# Patient Record
Sex: FEMALE | Race: WHITE | NOT HISPANIC OR LATINO | Employment: PART TIME | URBAN - METROPOLITAN AREA
[De-identification: names, ages, dates, MRNs, and addresses within clinical notes are randomized per-mention and may not be internally consistent; named-entity substitution may affect disease eponyms.]

---

## 2017-01-12 ENCOUNTER — HOSPITAL ENCOUNTER (OUTPATIENT)
Dept: GENERAL RADIOLOGY | Facility: HOSPITAL | Age: 67
Discharge: HOME | End: 2017-01-12
Admitting: ORTHOPAEDIC SURGERY

## 2017-01-12 ENCOUNTER — APPOINTMENT (OUTPATIENT)
Dept: PREADMISSION TESTING | Facility: HOSPITAL | Age: 67
End: 2017-01-12

## 2017-01-12 VITALS — HEIGHT: 66 IN | WEIGHT: 227.07 LBS | BODY MASS INDEX: 36.49 KG/M2

## 2017-01-12 DIAGNOSIS — Z01.89 LABORATORY TEST: Primary | ICD-10-CM

## 2017-01-12 LAB
ABO GROUP BLD: NORMAL
ANION GAP SERPL CALCULATED.3IONS-SCNC: 13 MMOL/L (ref 3–11)
BILIRUB UR QL STRIP: NEGATIVE
BUN BLD-MCNC: 17 MG/DL (ref 9–23)
BUN/CREAT SERPL: 18.9 (ref 7–25)
CALCIUM SPEC-SCNC: 10.3 MG/DL (ref 8.7–10.4)
CHLORIDE SERPL-SCNC: 99 MMOL/L (ref 99–109)
CLARITY UR: CLEAR
CO2 SERPL-SCNC: 28 MMOL/L (ref 20–31)
COLOR UR: YELLOW
CREAT BLD-MCNC: 0.9 MG/DL (ref 0.6–1.3)
CRP SERPL-MCNC: 30.9 MG/DL (ref 0–10)
DEPRECATED RDW RBC AUTO: 42 FL (ref 37–54)
ERYTHROCYTE [DISTWIDTH] IN BLOOD BY AUTOMATED COUNT: 13.8 % (ref 11.3–14.5)
ERYTHROCYTE [SEDIMENTATION RATE] IN BLOOD: 49 MM/HR (ref 0–30)
GFR SERPL CREATININE-BSD FRML MDRD: 63 ML/MIN/1.73
GLUCOSE BLD-MCNC: 92 MG/DL (ref 70–100)
GLUCOSE UR STRIP-MCNC: NEGATIVE MG/DL
HBA1C MFR BLD: 5.9 % (ref 4.8–5.6)
HCT VFR BLD AUTO: 38.7 % (ref 34.5–44)
HGB BLD-MCNC: 12 G/DL (ref 11.5–15.5)
HGB UR QL STRIP.AUTO: NEGATIVE
KETONES UR QL STRIP: NEGATIVE
LEUKOCYTE ESTERASE UR QL STRIP.AUTO: NEGATIVE
MCH RBC QN AUTO: 26.1 PG (ref 27–31)
MCHC RBC AUTO-ENTMCNC: 31 G/DL (ref 32–36)
MCV RBC AUTO: 84.1 FL (ref 80–99)
MRSA DNA SPEC QL NAA+PROBE: NEGATIVE
NITRITE UR QL STRIP: NEGATIVE
PH UR STRIP.AUTO: 5.5 [PH] (ref 5–8)
PLATELET # BLD AUTO: 312 10*3/MM3 (ref 150–450)
PMV BLD AUTO: 9.5 FL (ref 6–12)
POTASSIUM BLD-SCNC: 3.7 MMOL/L (ref 3.5–5.5)
PROT UR QL STRIP: NEGATIVE
RBC # BLD AUTO: 4.6 10*6/MM3 (ref 3.89–5.14)
RH BLD: NEGATIVE
SODIUM BLD-SCNC: 140 MMOL/L (ref 132–146)
SP GR UR STRIP: 1.01 (ref 1–1.03)
UROBILINOGEN UR QL STRIP: NORMAL
WBC NRBC COR # BLD: 5.44 10*3/MM3 (ref 3.5–10.8)

## 2017-01-12 PROCEDURE — 86901 BLOOD TYPING SEROLOGIC RH(D): CPT

## 2017-01-12 PROCEDURE — 71020 HC CHEST PA AND LATERAL: CPT

## 2017-01-12 PROCEDURE — 87641 MR-STAPH DNA AMP PROBE: CPT | Performed by: ORTHOPAEDIC SURGERY

## 2017-01-12 PROCEDURE — 93005 ELECTROCARDIOGRAM TRACING: CPT

## 2017-01-12 PROCEDURE — 83036 HEMOGLOBIN GLYCOSYLATED A1C: CPT | Performed by: ORTHOPAEDIC SURGERY

## 2017-01-12 PROCEDURE — 85652 RBC SED RATE AUTOMATED: CPT | Performed by: ORTHOPAEDIC SURGERY

## 2017-01-12 PROCEDURE — 81003 URINALYSIS AUTO W/O SCOPE: CPT | Performed by: ORTHOPAEDIC SURGERY

## 2017-01-12 PROCEDURE — 80048 BASIC METABOLIC PNL TOTAL CA: CPT | Performed by: ORTHOPAEDIC SURGERY

## 2017-01-12 PROCEDURE — 85027 COMPLETE CBC AUTOMATED: CPT | Performed by: ORTHOPAEDIC SURGERY

## 2017-01-12 PROCEDURE — 86140 C-REACTIVE PROTEIN: CPT | Performed by: ORTHOPAEDIC SURGERY

## 2017-01-12 PROCEDURE — 86900 BLOOD TYPING SEROLOGIC ABO: CPT

## 2017-01-12 PROCEDURE — 36415 COLL VENOUS BLD VENIPUNCTURE: CPT

## 2017-01-12 PROCEDURE — 93010 ELECTROCARDIOGRAM REPORT: CPT | Performed by: INTERNAL MEDICINE

## 2017-01-12 RX ORDER — HYDROCHLOROTHIAZIDE 12.5 MG/1
12.5 TABLET ORAL DAILY
COMMUNITY
End: 2020-05-14

## 2017-01-12 NOTE — DISCHARGE INSTRUCTIONS
The following information and instructions were given:    NPO after MN except sips of water with routine prescribed medication (except blood thinner, diabetes, or weight reducing medication) unless otherwise instructed by your physician.  Do not eat, drink, smoke or chew gum after MN the night before surgery. This also includes no mints.    DO NOT shave, wear makeup or dark nail polish.    Remove all jewelry (advised to go to jeweler if unable to remove).    Leave anything you consider valuable at home.    Leave your suitcase in the car until after your surgery.    Bring the following with you (if applicable)   -picture ID and insurance cards   -Co-pay/deductible required by insurance   -Medications in the original bottles (not a list) including all over-the-counter  medications if not brought to PAT   -Copy of advance directive, living will or power of  documents if not  brought to PAT   -CPAP or BIPAP mask and tubing (do not bring machine)   -Skin prep instructions sheet   -PAT Pass   Education booklet, brochure, handout or binder given to patient.    Pain Control After Surgery handout given to patient.    Respirex use (handout given to patient) and pneumonia prevention.    Signs and Symptoms of infection.    DVT Prevention stressing the importance of ambulation.    Patient to apply Chlorhexadine wipes to surgical area (as instructed) the night before procedure and the AM of procedure.

## 2017-01-12 NOTE — PAT
"Too early for type and screen; please draw the morning of surgery  Dr. Carlson \"okayed\" patient for surgery   Patient attended joint class on 1-12-17  Aries montero measurements:  Length: 20  Width: 19  "

## 2017-01-12 NOTE — PAT
Marlen with [facility] states she is calling Dr. Moreno office to ensure an  is available for patient on the day of surgery

## 2017-02-07 ENCOUNTER — ANESTHESIA EVENT (OUTPATIENT)
Dept: PERIOP | Facility: HOSPITAL | Age: 67
End: 2017-02-07

## 2017-02-08 ENCOUNTER — ANESTHESIA (OUTPATIENT)
Dept: PERIOP | Facility: HOSPITAL | Age: 67
End: 2017-02-08

## 2017-02-08 ENCOUNTER — APPOINTMENT (OUTPATIENT)
Dept: GENERAL RADIOLOGY | Facility: HOSPITAL | Age: 67
End: 2017-02-08

## 2017-02-08 PROBLEM — J45.909 ASTHMA: Status: ACTIVE | Noted: 2017-02-08

## 2017-02-08 PROBLEM — I10 HTN (HYPERTENSION): Status: ACTIVE | Noted: 2017-02-08

## 2017-02-08 PROBLEM — H91.90 DEAF: Status: ACTIVE | Noted: 2017-02-08

## 2017-02-08 PROBLEM — M17.9 OA (OSTEOARTHRITIS) OF KNEE: Status: ACTIVE | Noted: 2017-02-08

## 2017-02-08 PROBLEM — R73.03 PREDIABETES: Status: ACTIVE | Noted: 2017-02-08

## 2017-02-08 PROBLEM — G89.18 ACUTE POST-OPERATIVE PAIN: Status: ACTIVE | Noted: 2017-02-08

## 2017-02-08 PROBLEM — Z96.651 STATUS POST TOTAL RIGHT KNEE REPLACEMENT: Status: ACTIVE | Noted: 2017-02-08

## 2017-02-08 PROCEDURE — 25010000002 DEXAMETHASONE PER 1 MG: Performed by: NURSE ANESTHETIST, CERTIFIED REGISTERED

## 2017-02-08 PROCEDURE — 25010000002 PROPOFOL 10 MG/ML EMULSION: Performed by: NURSE ANESTHETIST, CERTIFIED REGISTERED

## 2017-02-08 PROCEDURE — 25010000002 ONDANSETRON PER 1 MG: Performed by: NURSE ANESTHETIST, CERTIFIED REGISTERED

## 2017-02-08 PROCEDURE — 25010000002 NEOSTIGMINE 10 MG/10ML SOLUTION: Performed by: NURSE ANESTHETIST, CERTIFIED REGISTERED

## 2017-02-08 PROCEDURE — 25010000002 PROPOFOL 1000 MG/ML EMULSION: Performed by: NURSE ANESTHETIST, CERTIFIED REGISTERED

## 2017-02-08 PROCEDURE — 25010000002 MIDAZOLAM PER 1 MG: Performed by: NURSE ANESTHETIST, CERTIFIED REGISTERED

## 2017-02-08 PROCEDURE — 25010000003 MEPIVACAINE PER 10 ML: Performed by: NURSE ANESTHETIST, CERTIFIED REGISTERED

## 2017-02-08 PROCEDURE — 25010000003 CEFAZOLIN IN DEXTROSE 2-4 GM/100ML-% SOLUTION: Performed by: ORTHOPAEDIC SURGERY

## 2017-02-08 PROCEDURE — 73560 X-RAY EXAM OF KNEE 1 OR 2: CPT

## 2017-02-08 PROCEDURE — 25010000002 FENTANYL CITRATE (PF) 100 MCG/2ML SOLUTION: Performed by: NURSE ANESTHETIST, CERTIFIED REGISTERED

## 2017-02-08 RX ORDER — TRANEXAMIC ACID 100 MG/ML
INJECTION, SOLUTION INTRAVENOUS AS NEEDED
Status: DISCONTINUED | OUTPATIENT
Start: 2017-02-08 | End: 2017-02-08 | Stop reason: SURG

## 2017-02-08 RX ORDER — ATRACURIUM BESYLATE 10 MG/ML
INJECTION, SOLUTION INTRAVENOUS AS NEEDED
Status: DISCONTINUED | OUTPATIENT
Start: 2017-02-08 | End: 2017-02-08 | Stop reason: SURG

## 2017-02-08 RX ORDER — BUPIVACAINE HYDROCHLORIDE 2.5 MG/ML
INJECTION, SOLUTION EPIDURAL; INFILTRATION; INTRACAUDAL AS NEEDED
Status: DISCONTINUED | OUTPATIENT
Start: 2017-02-08 | End: 2017-02-08 | Stop reason: SURG

## 2017-02-08 RX ORDER — PROPOFOL 10 MG/ML
VIAL (ML) INTRAVENOUS AS NEEDED
Status: DISCONTINUED | OUTPATIENT
Start: 2017-02-08 | End: 2017-02-08 | Stop reason: SURG

## 2017-02-08 RX ORDER — PROPOFOL 10 MG/ML
VIAL (ML) INTRAVENOUS AS NEEDED
Status: DISCONTINUED | OUTPATIENT
Start: 2017-02-08 | End: 2017-02-08

## 2017-02-08 RX ORDER — ONDANSETRON 2 MG/ML
INJECTION INTRAMUSCULAR; INTRAVENOUS AS NEEDED
Status: DISCONTINUED | OUTPATIENT
Start: 2017-02-08 | End: 2017-02-08 | Stop reason: SURG

## 2017-02-08 RX ORDER — NEOSTIGMINE METHYLSULFATE 1 MG/ML
INJECTION, SOLUTION INTRAVENOUS AS NEEDED
Status: DISCONTINUED | OUTPATIENT
Start: 2017-02-08 | End: 2017-02-08 | Stop reason: SURG

## 2017-02-08 RX ORDER — FENTANYL CITRATE 50 UG/ML
INJECTION, SOLUTION INTRAMUSCULAR; INTRAVENOUS AS NEEDED
Status: DISCONTINUED | OUTPATIENT
Start: 2017-02-08 | End: 2017-02-08 | Stop reason: SURG

## 2017-02-08 RX ORDER — DEXAMETHASONE SODIUM PHOSPHATE 4 MG/ML
INJECTION, SOLUTION INTRA-ARTICULAR; INTRALESIONAL; INTRAMUSCULAR; INTRAVENOUS; SOFT TISSUE AS NEEDED
Status: DISCONTINUED | OUTPATIENT
Start: 2017-02-08 | End: 2017-02-08 | Stop reason: SURG

## 2017-02-08 RX ORDER — MIDAZOLAM HYDROCHLORIDE 1 MG/ML
INJECTION INTRAMUSCULAR; INTRAVENOUS AS NEEDED
Status: DISCONTINUED | OUTPATIENT
Start: 2017-02-08 | End: 2017-02-08 | Stop reason: SURG

## 2017-02-08 RX ORDER — GLYCOPYRROLATE 0.2 MG/ML
INJECTION INTRAMUSCULAR; INTRAVENOUS AS NEEDED
Status: DISCONTINUED | OUTPATIENT
Start: 2017-02-08 | End: 2017-02-08 | Stop reason: SURG

## 2017-02-08 RX ORDER — LIDOCAINE HYDROCHLORIDE 10 MG/ML
INJECTION, SOLUTION EPIDURAL; INFILTRATION; INTRACAUDAL; PERINEURAL AS NEEDED
Status: DISCONTINUED | OUTPATIENT
Start: 2017-02-08 | End: 2017-02-08 | Stop reason: SURG

## 2017-02-08 RX ADMIN — NEOSTIGMINE METHYLSULFATE 4 MG: 1 INJECTION, SOLUTION INTRAVENOUS at 14:00

## 2017-02-08 RX ADMIN — ROBINUL 0.6 MG: 0.2 INJECTION INTRAMUSCULAR; INTRAVENOUS at 14:00

## 2017-02-08 RX ADMIN — TRANEXAMIC ACID 1000 MG: 100 INJECTION, SOLUTION INTRAVENOUS at 11:26

## 2017-02-08 RX ADMIN — FENTANYL CITRATE 100 MCG: 50 INJECTION, SOLUTION INTRAMUSCULAR; INTRAVENOUS at 11:18

## 2017-02-08 RX ADMIN — SODIUM CHLORIDE, POTASSIUM CHLORIDE, SODIUM LACTATE AND CALCIUM CHLORIDE: 600; 310; 30; 20 INJECTION, SOLUTION INTRAVENOUS at 13:40

## 2017-02-08 RX ADMIN — MEPIVACAINE HYDROCHLORIDE 20 ML: 15 INJECTION, SOLUTION EPIDURAL; INFILTRATION at 14:15

## 2017-02-08 RX ADMIN — CEFAZOLIN SODIUM 2 G: 2 INJECTION, SOLUTION INTRAVENOUS at 11:30

## 2017-02-08 RX ADMIN — MIDAZOLAM HYDROCHLORIDE 2 MG: 1 INJECTION, SOLUTION INTRAMUSCULAR; INTRAVENOUS at 11:18

## 2017-02-08 RX ADMIN — BUPIVACAINE HYDROCHLORIDE 20 ML: 2.5 INJECTION, SOLUTION EPIDURAL; INFILTRATION; INTRACAUDAL; PERINEURAL at 14:25

## 2017-02-08 RX ADMIN — LIDOCAINE HYDROCHLORIDE 30 MG: 10 INJECTION, SOLUTION EPIDURAL; INFILTRATION; INTRACAUDAL; PERINEURAL at 11:30

## 2017-02-08 RX ADMIN — DEXAMETHASONE SODIUM PHOSPHATE 8 MG: 4 INJECTION, SOLUTION INTRAMUSCULAR; INTRAVENOUS at 11:30

## 2017-02-08 RX ADMIN — ONDANSETRON 4 MG: 2 INJECTION INTRAMUSCULAR; INTRAVENOUS at 14:00

## 2017-02-08 RX ADMIN — ATRACURIUM BESYLATE 40 MG: 10 INJECTION, SOLUTION INTRAVENOUS at 11:30

## 2017-02-08 RX ADMIN — TRANEXAMIC ACID 1000 MG: 100 INJECTION, SOLUTION INTRAVENOUS at 13:23

## 2017-02-08 RX ADMIN — PROPOFOL 150 MG: 10 INJECTION, EMULSION INTRAVENOUS at 11:30

## 2017-02-08 RX ADMIN — PROPOFOL 100 MCG/KG/MIN: 10 INJECTION, EMULSION INTRAVENOUS at 11:34

## 2017-02-08 NOTE — ANESTHESIA PROCEDURE NOTES
Peripheral Block    Patient location during procedure: OR  Start time: 2/8/2017 2:12 PM  Stop time: 2/8/2017 2:15 PM  Performed by  Anesthesiologist: MIKO SMITH  Preanesthetic Checklist  Completed: patient identified, site marked, surgical consent, pre-op evaluation, timeout performed, IV checked, risks and benefits discussed and monitors and equipment checked  Peripheral Block Prep:  Sterile barriers:cap, gloves and sterile barriers  Prep: ChloraPrep  Patient monitoring: blood pressure monitoring, continuous pulse oximetry and EKG  Peripheral Procedure  Nursing cardiac assessment comments yes: GA.  Guidance:ultrasound guided  Images:still images obtained  Laterality:rightBlock Type:femoral  Injection Technique:single-shotNeedle Type:Tuohy  ULTRASOUND INTERPRETATION.  Using ultrasound guidance a 21 G gauge needle was placed in close proximity to the femoral nerve, at which point, under ultrasound guidance anesthetic was injected in the area of the nerve and spread of the anesthesia was seen on ultrasound in close proximity thereto.  There were no abnormalities seen on ultrasound; a digital image was taken; and the patient tolerated the procedure with no complications.   Medications  Local Injected:1.5% Mepivacaine Local Amount Injected:20mL  Post Assessment  Patient Tolerance:comfortable throughout block  Complications:no  Additional Notes  Single shot R Femoral block performed under GA.

## 2017-02-08 NOTE — ANESTHESIA PROCEDURE NOTES
Airway  Urgency: elective    Airway not difficult    General Information and Staff    Patient location during procedure: OR  Anesthesiologist: ANUPAM DORANTES  CRNA: MIKO SWIFT    Indications and Patient Condition  Indications for airway management: airway protection    Preoxygenated: yes  MILS not maintained throughout  Mask difficulty assessment: 1 - vent by mask    Final Airway Details  Final airway type: endotracheal airway      Successful airway: ETT  Cuffed: yes   Successful intubation technique: direct laryngoscopy  Endotracheal tube insertion site: oral  Blade: [place]  Blade size: #2  ETT size: 7.0 mm  Cormack-Lehane Classification: grade I - full view of glottis  Placement verified by: chest auscultation and capnometry   Cuff volume (mL): 5  Measured from: lips  ETT to lips (cm): 20  Number of attempts at approach: 1    Additional Comments  Negative epigastric sounds, Breath sound equal bilaterally with symmetric chest rise and fall

## 2017-02-08 NOTE — ANESTHESIA POSTPROCEDURE EVALUATION
Patient: Ritu Gtz    Procedure Summary     Date Anesthesia Start Anesthesia Stop Room / Location    02/08/17 1114 1437 [facility] / [facility]       Procedure Diagnosis Surgeon Provider    RIGHT TOTAL KNEE ARTHROPLASTY (Right Knee) No diagnosis on file. MD Oz Matias MD          Anesthesia Type: spinal, MAC  Last vitals  BP     Temp      Pulse     Resp      SpO2        Post Anesthesia Care and Evaluation    Patient location during evaluation: PACU  Patient participation: complete - patient participated  Level of consciousness: sleepy but conscious  Pain management: adequate  Airway patency: patent  Anesthetic complications: No anesthetic complications  PONV Status: none  Cardiovascular status: hemodynamically stable and acceptable  Respiratory status: nonlabored ventilation, acceptable and nasal cannula  Hydration status: acceptable    Comments: Pt transported to PACU with O2 via nasal cannula at 4 L/MIN. Vital signs stable.  Pt shows no signs of distress.  Report given to PACU RN.

## 2017-02-08 NOTE — ANESTHESIA PREPROCEDURE EVALUATION
Anesthesia Evaluation     Patient summary reviewed and Nursing notes reviewed   history of anesthetic complications (ponv):  NPO Status: > 8 hours   Airway   Mallampati: I  TM distance: >3 FB  Neck ROM: full  Dental      Comment: Upper front crowns, pt is deaf    Pulmonary    (+) asthma,   Cardiovascular     ECG reviewed    (+) hypertension,     ROS comment: EKG NSR     Neuro/Psych  GI/Hepatic/Renal/Endo      Musculoskeletal     Abdominal    Substance History      OB/GYN          Other   (+) arthritis                               Anesthesia Plan    ASA 3     spinal and MAC   (Propofol   ACB post op)  intravenous induction   Anesthetic plan and risks discussed with patient.

## 2017-02-08 NOTE — ANESTHESIA PROCEDURE NOTES
Peripheral Block    Patient location during procedure: OR  Start time: 2/8/2017 2:22 PM  Stop time: 2/8/2017 2:31 PM  Reason for block: at surgeon's request and post-op pain management  Performed by  CRNA: BENJA REDDY  Preanesthetic Checklist  Completed: patient identified, site marked, surgical consent, pre-op evaluation, timeout performed, IV checked, risks and benefits discussed and monitors and equipment checked  Peripheral Block Prep:  Sterile barriers:cap, gloves, mask and sterile barriers  Prep: ChloraPrep  Patient monitoring: blood pressure monitoring, continuous pulse oximetry and EKG  Peripheral Procedure  Nursing cardiac assessment comments yes: GA.  Guidance:ultrasound guided  Images:still images obtained  Laterality:rightBlock Type:adductor canal block  Injection Technique:catheterNeedle Type:Tuohy  Needle Gauge:18 G  Catheter Size:20 G (20g)ULTRASOUND INTERPRETATION. Using ultrasound guidance a gauge needle was placed in close proximity to the femoral nerve, at which point, under ultrasound guidance anesthetic was injected in the area of the nerve and spread of the anesthesia was seen on ultrasound in close proximity thereto.  There were no abnormalities seen on ultrasound; a digital image was taken; and the patient tolerated the procedure with no complications.   Medications  Local Injected:bupivacaine 0.25% without epinephrine Local Amount Injected:20 (ml)mL  Post Assessment  Patient Tolerance:comfortable throughout block  Complications:no  Additional Notes  Procedure:           CATHETER at skin: 11                                 Analgesia was achieved with General Anesthesia       The pt was placed in the Supine position.  The Insertion site was  prepped and Draped in sterile fashion.  A BBraun 4 inch 18g echogenic needle was then  inserted approximately midline, mid-thigh and advanced In-plane with Ultrasound guidance.  Normal Saline PSF was utilized for hydrodissection of tissue.  The  Vastus medialis and Sartorius muscle where visualized and the needle tip was placed in the adductor canal,  lateral to the femoral artery.  LA injection spread was visualized, injection was incremental 1-5ml, injection pressure was normal or little, no intraneural injection, no vascular injection.  LA dose was injected thru the needle(see dose above).  A BBraun 20g wire stylet catheter was placed via the needle with ultrasound visualization and confirmation with NS fluid bolus. The labeled Catheter was then secured to skin at insertion site with skin afix and steristrips to curled catheter and CHG transparent dressing.  Thank you.

## 2017-02-10 PROBLEM — D62 ACUTE BLOOD LOSS ANEMIA: Status: ACTIVE | Noted: 2017-02-10

## 2017-02-10 PROBLEM — R33.8 POSTOPERATIVE URINARY RETENTION: Status: ACTIVE | Noted: 2017-02-10

## 2017-02-10 PROBLEM — N99.89 POSTOPERATIVE URINARY RETENTION: Status: ACTIVE | Noted: 2017-02-10

## 2017-09-14 ENCOUNTER — OFFICE VISIT (OUTPATIENT)
Dept: ORTHOPEDIC SURGERY | Facility: CLINIC | Age: 67
End: 2017-09-14

## 2017-09-14 VITALS — HEIGHT: 64 IN | WEIGHT: 206.4 LBS | BODY MASS INDEX: 35.24 KG/M2

## 2017-09-14 DIAGNOSIS — M17.12 PRIMARY OSTEOARTHRITIS OF LEFT KNEE: ICD-10-CM

## 2017-09-14 DIAGNOSIS — Z96.651 STATUS POST TOTAL RIGHT KNEE REPLACEMENT: Primary | ICD-10-CM

## 2017-09-14 PROCEDURE — 99213 OFFICE O/P EST LOW 20 MIN: CPT | Performed by: ORTHOPAEDIC SURGERY

## 2017-09-14 RX ORDER — RIVAROXABAN 20 MG/1
TABLET, FILM COATED ORAL
Refills: 2 | COMMUNITY
Start: 2017-07-07 | End: 2017-09-14 | Stop reason: DRUGHIGH

## 2017-09-14 RX ORDER — PROMETHAZINE HYDROCHLORIDE 12.5 MG/1
TABLET ORAL
Refills: 0 | COMMUNITY
Start: 2017-08-23 | End: 2019-12-19

## 2017-09-14 NOTE — PROGRESS NOTES
[facility] [place] [facility] Note    Subjective     Chief Complaint   Patient presents with   • Follow-up     history of right total knee arthroplasty 2/8/2017        IVONE Gtz is a 67 y.o. female. Patient is here for follow-up of her right total knee arthroplasty done in February 2017.  She is feeling well enough to travel over the last several months.  She still has some difficulty descending stairs.  She is also had a recent worsening of her left knee pain.  She is asking about physical therapy with regards to the left knee.     Past Medical History:   Diagnosis Date   • Anemia    • Arthritis    • Asthma    • Deaf    • History of complications due to general anesthesia    • Hypertension    • Osteoporosis    • PONV (postoperative nausea and vomiting)    • Primary osteoarthritis of both knees    • Rheumatoid arthritis    • SOB (shortness of breath)    • Wears dentures    • Wears glasses       Past Surgical History:   Procedure Laterality Date   • APPENDECTOMY     • COLONOSCOPY     • FRACTURE SURGERY      triggered thumb   • HYSTERECTOMY     • PARTIAL KNEE ARTHROPLASTY Right    • WA TOTAL KNEE ARTHROPLASTY Right 2/8/2017    Procedure: RIGHT TOTAL KNEE ARTHROPLASTY;  Surgeon: Milton Romero MD;  [facility]: [facility];  Service: Orthopedics   • SHOULDER SURGERY      scope with ligament repair    • TONSILLECTOMY        Family History   Problem Relation Age of Onset   • Cancer Mother    • Depression Mother    • Cancer Father    • Diabetes Father      Social History     Social History   • Marital status:      Spouse name: N/A   • Number of children: N/A   • Years of education: N/A     Occupational History   • Not on file.     Social History Main Topics   • Smoking status: Never Smoker   • Smokeless tobacco: Never Used   • Alcohol use No   • Drug use: No   • Sexual activity: Defer     Other Topics Concern   • Not on file     Social History Narrative      Current Outpatient Prescriptions  on File Prior to Visit   Medication Sig Dispense Refill   • acetaminophen (TYLENOL) 500 MG tablet Take  by mouth Take As Directed.     • aluminum-magnesium hydroxide-simethicone (MAALOX ADVANCED MAX ST) 400-400-40 MG/5ML suspension Take  by mouth Take As Directed.     • aspirin  MG tablet Take 1 tablet by mouth Daily. For 1 month after finishing Lovenox injections  0   • bisacodyl (BISACODYL LAXATIVE) 10 MG suppository Insert  into the rectum Take As Directed.     • cefdinir (OMNICEF) 300 MG capsule Take  by mouth Take As Directed.     • Cyanocobalamin (VITAMIN B-12 CR PO) Take 1 tablet by mouth Daily.     • docusate sodium 100 MG capsule Take 100 mg by mouth 2 (Two) Times a Day As Needed for constipation.  0   • enoxaparin (LOVENOX) 40 MG/0.4ML solution syringe Inject 0.4 mL under the skin Daily. For 2 weeks 11.2 mL    • famotidine (PEPCID) 20 MG tablet Take  by mouth Take As Directed.     • hydrochlorothiazide (HYDRODIURIL) 12.5 MG tablet Take 12.5 mg by mouth Daily.     • IPRATROPIUM-ALBUTEROL IN Inhale Take As Directed. Albuterol-Ipratropium 2.5-0.5MG/3ML solution     • LACTOBACILLUS PO Take  by mouth Take As Directed.     • miconazole (MICOTIN) 2 % powder Apply  topically Take As Directed.     • omeprazole (priLOSEC) 40 MG capsule Take  by mouth As Needed.     • ondansetron ODT (ZOFRAN-ODT) 4 MG disintegrating tablet Take  by mouth Take As Directed.     • oxyCODONE-acetaminophen (PERCOCET) 5-325 MG per tablet Take  by mouth As Needed.     • Rivaroxaban (XARELTO STARTER PACK) 15 & 20 MG tablet therapy pack Take  by mouth Take As Directed. Take as directed     • ropivacaine (NAROPIN) 0.2 % 24 mg/hr by Infiltration route Continuous.     • simethicone (MYLICON,GAS-X) 180 MG capsule Take  by mouth Take As Directed.     • tamsulosin (FLOMAX) 0.4 MG capsule 24 hr capsule Take 1 capsule by mouth Daily. 30 capsule    • TUBERCULIN PPD ID Inject  into the skin Take As Directed. Tuberculin PPD 5UNIT/0.1ML solution    "    No current facility-administered medications on file prior to visit.       Allergies   Allergen Reactions   • Darvon [Propoxyphene] Other (See Comments)     Passed out   • Onion         The following portions of the patient's history were reviewed and updated as appropriate: allergies, current medications, past family history, past medical history, past social history, past surgical history and problem list.    Review of Systems   Constitutional: Negative.    HENT: Negative.    Eyes: Negative.    Respiratory: Negative.    Cardiovascular: Negative.    Gastrointestinal: Negative.    Endocrine: Negative.    Genitourinary: Positive for difficulty urinating.   Musculoskeletal: Positive for arthralgias and back pain.   Skin: Negative.    Allergic/Immunologic: Negative.    Neurological: Positive for numbness.   Hematological: Negative.    Psychiatric/Behavioral: Negative.         Objective      Physical Exam  Ht 63.78\" (162 cm)  Wt 206 lb 6.4 oz (93.6 kg)  BMI 35.67 kg/m2    Body mass index is 35.67 kg/(m^2).    General  Mental Status - alert  General Appearance - cooperative, well groomed, not in acute distress  Orientation - Oriented X3  Build & Nutrition - well developed and well nourished  Posture - normal posture  Gait - normal gait     Integumentary  Global Assessment  Examination of related systems reveals - no lymphadenopathy  General Characteristics  Overall examination of the patient's skin reveals - no rashes, no evidence of scars, no suspicious lesions and no bruises.  Color - normal coloration of skin.    Ortho Exam  Peripheral Vascular:    Upper Extremity:   Inspection:  Left--no cyanotic nail beds Right--no cyanotic nail beds   Bilateral:  Pink nail beds with brisk capillary refill   Palpation:  Bilateral radial pulse normal    Musculoskeletal:      Lower Extremity:   Assistive Device Used for Ambulation: None    Inspection and Palpation:      Right knee:  Calf:  Soft and non tender  Effusion:  " None  Pulses:  2+  Ecchymosis:  None  Warmth:  Appropriate  Incision:  Clean, dry, and intact.  Healing appropriately    ROM:  Right:  Extension: 0    Flexion: 120      Deformities/Malalignments/Discrepancies:    Right:  none    Functional Testing:  Right:  Straight Leg Raise: 5/5    Imaging/Studies  None    Assessment:  1. Status post total right knee replacement    2. Primary osteoarthritis of left knee        Plan:  1. With regards to her right knee, she should continue strengthening.  2. Over-the-counter medications for pain  3. With regards to her osteophytic left knee, we will send her to physical therapy in [place] [facility].  A passer to call back in 6 weeks if she is not better we can look into things further.  4. Otherwise, she will follow up in 5 months and hopefully we can see her at the [facility].      Medical Decision Making  Management Options : over-the-counter medicine and physical/occupational therapy      Milton Romero MD  09/14/17  3:41 PM

## 2018-01-11 ENCOUNTER — OFFICE VISIT (OUTPATIENT)
Dept: ORTHOPEDIC SURGERY | Facility: CLINIC | Age: 68
End: 2018-01-11

## 2018-01-11 VITALS
DIASTOLIC BLOOD PRESSURE: 92 MMHG | HEART RATE: 67 BPM | WEIGHT: 224.21 LBS | BODY MASS INDEX: 38.28 KG/M2 | HEIGHT: 64 IN | SYSTOLIC BLOOD PRESSURE: 168 MMHG

## 2018-01-11 DIAGNOSIS — M79.661 RIGHT CALF PAIN: ICD-10-CM

## 2018-01-11 DIAGNOSIS — Z96.651 HISTORY OF TOTAL RIGHT KNEE REPLACEMENT: Primary | ICD-10-CM

## 2018-01-11 DIAGNOSIS — Z86.718 HISTORY OF DVT OF LOWER EXTREMITY: ICD-10-CM

## 2018-01-11 PROCEDURE — 99214 OFFICE O/P EST MOD 30 MIN: CPT | Performed by: ORTHOPAEDIC SURGERY

## 2018-01-11 RX ORDER — IBUPROFEN 200 MG
200 TABLET ORAL EVERY 6 HOURS PRN
COMMUNITY
End: 2020-05-14

## 2018-01-11 NOTE — PROGRESS NOTES
"    [facility] [place] [facility] Note    Subjective     CC: Follow-up (4 month f/u - 11 months s/p RIGHT TOTAL KNEE ARTHROPLASTY - 2/8/2017)      IVONE Gtz is a 67 y.o. female. Patient is almost a year out from right total knee arthroplasty.  She is doing very well.  She has very little knee pain.  Her main complaint today is of cramping in her right calf.  She did have a DVT after surgery.  She is been off blood thinners since August.  Her calf has been cramping over the last 1-2 months.  She has seen a spine surgeon in the past for a chronic lumbar issue.      ROS:    Constiutional:Pt denies fever, chills, nausea, or vomiting.  MSK:as above    Objective      Past Medical History  Past Medical History:   Diagnosis Date   • Anemia    • Arthritis    • Asthma    • Deaf    • History of complications due to general anesthesia    • Hypertension    • Osteoporosis    • PONV (postoperative nausea and vomiting)    • Primary osteoarthritis of both knees    • Rheumatoid arthritis    • SOB (shortness of breath)    • Wears dentures    • Wears glasses          Physical Exam  /92  Pulse 67  Ht 162 cm (63.78\")  Wt 102 kg (224 lb 3.3 oz)  BMI 38.75 kg/m2    Body mass index is 38.75 kg/(m^2).      Ortho Exam  Peripheral Vascular:    Upper Extremity:   Inspection:  Left--no cyanotic nail beds Right--no cyanotic nail beds   Bilateral:  Pink nail beds with brisk capillary refill   Palpation:  Bilateral radial pulse normal    Musculoskeletal:      Lower Extremity:   Assistive Device Used for Ambulation: None    Inspection and Palpation:      Right knee:  Calf:  Soft and non tender  Effusion:  None  Pulses:  2+  Ecchymosis:  None  Warmth:  Appropriate  Incision:  Healed    ROM:  Right:  Extension: 0    Flexion: 120      Deformities/Malalignments/Discrepancies:    Right:  none    Functional Testing:  Right:  Straight Leg Raise: 5/5    Imaging/Labs/EMG Reviewed:  X-rays of her right knee are compared to her x-rays " from 11 months ago and look good with no signs of loosening.    Assessment:  1. History of total right knee replacement    2. History of DVT of lower extremity    3. Right calf pain        Plan:  1. Recommend over the counter anti-inflammatories for pain and/or swelling  2. Because of her calf pain and history of DVT, we will be careful and order a venous duplex to make sure there is no new clot present.  It would be rare but certainly not impossible.  We will send her upstairs this afternoon to try and get that done.  In the absence of acute clot, we can either treat her symptomatically, phlebitis or most likely, her low back which is a possible etiology of her calf pain.  She would like to do that in [place].  3. If everything checks out, I will see her back yearly for her right knee.    Medical Decision Making  Management Options : over-the-counter medicine  Data/Risk: lab tests, radiology tests and independent visualization of imaging, lab tests, or EMG/NCV    Milton Romero MD  01/11/18  11:39 AM

## 2018-07-24 ENCOUNTER — OFFICE VISIT (OUTPATIENT)
Dept: ORTHOPEDIC SURGERY | Facility: CLINIC | Age: 68
End: 2018-07-24

## 2018-07-24 VITALS — HEIGHT: 64 IN | OXYGEN SATURATION: 98 % | BODY MASS INDEX: 36.89 KG/M2 | HEART RATE: 86 BPM | WEIGHT: 216.05 LBS

## 2018-07-24 DIAGNOSIS — G89.29 CHRONIC PAIN OF LEFT KNEE: Primary | ICD-10-CM

## 2018-07-24 DIAGNOSIS — M25.562 CHRONIC PAIN OF LEFT KNEE: Primary | ICD-10-CM

## 2018-07-24 DIAGNOSIS — M17.12 PRIMARY OSTEOARTHRITIS OF LEFT KNEE: ICD-10-CM

## 2018-07-24 PROCEDURE — 99213 OFFICE O/P EST LOW 20 MIN: CPT | Performed by: PHYSICIAN ASSISTANT

## 2018-07-24 PROCEDURE — 20610 DRAIN/INJ JOINT/BURSA W/O US: CPT | Performed by: PHYSICIAN ASSISTANT

## 2018-07-24 RX ORDER — GABAPENTIN 300 MG/1
CAPSULE ORAL 2 TIMES DAILY
Refills: 2 | COMMUNITY
Start: 2018-07-10 | End: 2022-09-26

## 2018-07-24 RX ORDER — DOXYCYCLINE 100 MG/1
100 TABLET ORAL 2 TIMES DAILY
Refills: 0 | COMMUNITY
Start: 2018-04-17 | End: 2019-12-19

## 2018-07-24 RX ORDER — DICLOFENAC SODIUM 75 MG/1
TABLET, DELAYED RELEASE ORAL
Refills: 0 | COMMUNITY
Start: 2018-05-19 | End: 2019-12-19

## 2018-07-24 RX ADMIN — TRIAMCINOLONE ACETONIDE 40 MG: 40 INJECTION, SUSPENSION INTRA-ARTICULAR; INTRAMUSCULAR at 11:04

## 2018-07-24 RX ADMIN — LIDOCAINE HYDROCHLORIDE 3 ML: 10 INJECTION, SOLUTION INFILTRATION; PERINEURAL at 11:04

## 2018-07-24 NOTE — PROGRESS NOTES
Procedure   Large Joint Arthrocentesis  Date/Time: 7/24/2018 11:04 AM  Consent given by: patient  Site marked: site marked  Timeout: Immediately prior to procedure a time out was called to verify the correct patient, procedure, equipment, support staff and site/side marked as required   Supporting Documentation  Indications: pain   Procedure Details  Location: knee - L knee  Preparation: Patient was prepped and draped in the usual sterile fashion  Needle size: 25 G  Approach: anterolateral  Medications administered: 3 mL lidocaine 1 %; 40 mg triamcinolone acetonide 40 MG/ML  Patient tolerance: patient tolerated the procedure well with no immediate complications

## 2018-07-24 NOTE — PROGRESS NOTES
"    [facility] – [place] Orthopaedic Surgery Clinic Note    Subjective     CC: Pain of the Left Knee      HPI    Ritu Gtz is a 67 y.o. female. Patient returns today for follow-up evaluation on her left knee.  She reports a 5+ month history of left knee pain.  No recent injury or trauma.  She describes majority the pain to the medial aspect the knee.  Shortness pain scale of 8/10.  Severity the pain is moderate to severe.  Quality pain sharp and stabbing.  Associated symptoms include grinding.  Pains worse with walking and stair climbing.  No reported radiculopathy or paresthesias.       ROS:    Constiutional:Pt denies fever, chills, nausea, or vomiting.  MSK:as above    Objective      Past Medical History  Past Medical History:   Diagnosis Date   • Anemia    • Arthritis    • Asthma    • Deaf    • History of complications due to general anesthesia    • Hypertension    • Osteoporosis    • PONV (postoperative nausea and vomiting)    • Primary osteoarthritis of both knees    • Rheumatoid arthritis (CMS/HCC)    • SOB (shortness of breath)    • Wears dentures    • Wears glasses          Physical Exam  Pulse 86   Ht 162 cm (63.78\")   Wt 98 kg (216 lb 0.8 oz)   SpO2 98%   BMI 37.34 kg/m²     Body mass index is 37.34 kg/m².    Patient is well nourished and well developed.        Ortho Exam  Peripheral Vascular:    Upper Extremity:   Inspection:  Left--no cyanotic nail beds Right--no cyanotic nail beds   Bilateral:  Pink nail beds with brisk capillary refill   Palpation:  Bilateral radial pulse normal    Musculoskeletal:  Global Assessment:  Overall assessment of Lower Extremity Muscle Strength and Tone:  Left quadriceps--5/5   Left hamstrings--5/5       Left tibialis anterior--5/5  Left gastroc-soleus--5/5  Left EHL--5/5      Lower Extremity:  Knee/Patella:  No digital clubbing or cyanosis.    Examination of left knee reveals:  Normal deep tendon reflexes, coordination, strength, tone, sensation.  No known fractures or " deformities.    Inspection and Palpation:    Left knee:  Tenderness:  Medial joint line  Effusion:  Trace  Crepitus:  none  Pulses:  2+  Ecchymosis:  None  Warmth:  None       ROM:  Right:  Extension:0    Flexion:120  Left:  Extension:0     Flexion:120    Instability:    Left:  Lachman Test:  Negative, Varus stress test negative, Valgus stress test negative   Anterior Drawer Test:  Negative, Posterior Drawer Test:  Negative      Deformities/Malalignments/Discrepancies:    Left:  none  Right:  none    Functional Testing:    Left:  Ezequiel's test:  Negative  Patella grind test:  Negative  Q-angle:  Normal      Imaging/Labs/EMG Reviewed:  Imaging Results (last 24 hours)     ** No results found for the last 24 hours. **      Disc brought in from outside facility with AP and lateral left knee films from 6/8/08 were reviewed.  Patient has evidence of tricompartmental osteoarthritis.  No evidence of acute fracture or bony abnormality.    Assessment:  1. Chronic pain of left knee    2. Primary osteoarthritis of left knee        Plan:  1. Discussion was had with the patient regarding exam, imaging, assessment and treatment options.    2. Offered and accepted corticosteroid injection to the left knee today.  Given today.  3. Discussed the continue use of her prescribed pain and anti-inflammatory medication prescribed by her PCP.   4. Discussed weight loss.  5. Discussed various activities to help assist with knee range of motion and strengthening to include non to low impact activities such as biking or swimming.    6. Follow-up in 6 weeks for repeat evaluation sooner symptoms worsen or change.  We did discuss the possibility of Visco supplementation as another treatment modality.  7. Question and concerns answered.    After discussing the risks of injection, the patient gave consent to proceed.  Her left knee was confirmed as the correct joint to be injected with a timeout.  It was then prepped using Hibiclens and injected  with a mixture of 3 cc of 1% plain lidocaine and 1 cc of Kenalog (40 mg per mL) without any resistance through the anterior lateral approach, patient in seated position.  Area was cleaned, hemostasis was achieved and a Band-Aid was applied over the injection site.  The patient tolerated procedure well.  I instructed the patient on signs and symptoms of infection.  They should report to the emergency department or return to clinic if any of these develop, for further evaluation and treatment.  Recommended modifying activity for the next 48 hours to include rest, ice, elevation and oral pain medication as needed.      Patient also notes discoloring of skin when she wears certain jewelry, so it is believed that she does have a nickel allergy.    Medical Decision Making  Management Options : prescription/IM medicine, injection  Data/Risk: radiology tests    Abi Reno PA-C  07/26/18  3:40 PM

## 2018-07-25 ENCOUNTER — TELEPHONE (OUTPATIENT)
Dept: ORTHOPEDIC SURGERY | Facility: CLINIC | Age: 68
End: 2018-07-25

## 2018-07-25 NOTE — TELEPHONE ENCOUNTER
----- Message from Ritu Gtz sent at 7/25/2018  9:27 AM EDT -----  Regarding: Visit Follow-Up Question  Contact: [phone]  Good morning!   I just now saw the appointment that was scheduled for today at 8:40am. I am confused on that schedule as I was in your office yesterday. Can you clarify what is the schedule for today was for ?     Denisse Gtz

## 2018-07-25 NOTE — TELEPHONE ENCOUNTER
Abi, I called Ritu.  I answered all her questions except one.  She said she was on the patient portal and saw where there is a new clinic that is using stem cells injection for pain associated with osteoarthritis.  I didn't know what she was talking about, do you??  Dorie

## 2018-07-26 RX ORDER — TRIAMCINOLONE ACETONIDE 40 MG/ML
40 INJECTION, SUSPENSION INTRA-ARTICULAR; INTRAMUSCULAR
Status: COMPLETED | OUTPATIENT
Start: 2018-07-24 | End: 2018-07-24

## 2018-07-26 RX ORDER — LIDOCAINE HYDROCHLORIDE 10 MG/ML
3 INJECTION, SOLUTION INFILTRATION; PERINEURAL
Status: COMPLETED | OUTPATIENT
Start: 2018-07-24 | End: 2018-07-24

## 2018-09-04 ENCOUNTER — OFFICE VISIT (OUTPATIENT)
Dept: ORTHOPEDIC SURGERY | Facility: CLINIC | Age: 68
End: 2018-09-04

## 2018-09-04 VITALS — HEIGHT: 64 IN | WEIGHT: 240 LBS | OXYGEN SATURATION: 98 % | HEART RATE: 61 BPM | BODY MASS INDEX: 40.97 KG/M2

## 2018-09-04 DIAGNOSIS — M17.12 PRIMARY OSTEOARTHRITIS OF LEFT KNEE: ICD-10-CM

## 2018-09-04 DIAGNOSIS — M25.562 CHRONIC PAIN OF LEFT KNEE: Primary | ICD-10-CM

## 2018-09-04 DIAGNOSIS — G89.29 CHRONIC PAIN OF LEFT KNEE: Primary | ICD-10-CM

## 2018-09-04 PROCEDURE — 99212 OFFICE O/P EST SF 10 MIN: CPT | Performed by: PHYSICIAN ASSISTANT

## 2018-09-04 NOTE — PROGRESS NOTES
"    [facility] [place] Orthopaedic Surgery Clinic Note    Subjective     CC: Follow-up of the Left Knee (6 weeks)      IVONE Gtz is a 68 y.o. female.  Patient returns today for follow-up evaluation of left knee.  In late July 2018 she was given a corticosteroid injection.  She reports significant improvement in her pain.  She notes only intermittent pain to the left knee with a pain scale maximum 2/10.  Severity the pain is mild.  Quality the pain is aching with intermittent sharpness.  Denies any associated symptoms.  Worse with stair climbing or after prolonged sitting.  She continues use a cane to assist with ambulation.  No reported radiculopathy or paresthesias.       ROS:    Constiutional:Pt denies fever, chills, nausea, or vomiting.  MSK:as above    Objective      Past Medical History  Past Medical History:   Diagnosis Date   • Anemia    • Arthritis    • Asthma    • Deaf    • History of complications due to general anesthesia    • Hypertension    • Osteoporosis    • PONV (postoperative nausea and vomiting)    • Primary osteoarthritis of both knees    • Rheumatoid arthritis (CMS/HCC)    • SOB (shortness of breath)    • Wears dentures    • Wears glasses          Physical Exam  Pulse 61   Ht 162 cm (63.78\")   Wt 109 kg (240 lb)   SpO2 98%   BMI 41.48 kg/m²     Body mass index is 41.48 kg/m².    Patient is well nourished and well developed.        Ortho Exam  Peripheral Vascular:    Upper Extremity:   Inspection:  Left--no cyanotic nail beds          Right--no cyanotic nail beds              Bilateral:  Pink nail beds with brisk capillary refill              Palpation:  Bilateral radial pulse normal     Musculoskeletal:  Global Assessment:  Overall assessment of Lower Extremity Muscle Strength and Tone:  Left quadriceps--5/5      Left hamstrings--5/5       Left tibialis anterior--5/5  Left gastroc-soleus--5/5  Left EHL--5/5        Lower Extremity:  Knee/Patella:  No digital clubbing or cyanosis.  "   Examination of left knee reveals:  Normal deep tendon reflexes, coordination, strength, tone, sensation.  No known fractures or deformities.     Inspection and Palpation:    Left knee:  Tenderness:  Medial joint line but much improved since last visit  Effusion:   none  Crepitus:  none  Pulses:  2+  Ecchymosis:  None  Warmth:  None         ROM:  Right:  Extension:0        Flexion:120  Left:  Extension:0     Flexion:120     Instability:    Left:  Lachman Test:  Negative, Varus stress test negative, Valgus stress test negative              Anterior Drawer Test:  Negative, Posterior Drawer Test:  Negative        Deformities/Malalignments/Discrepancies:    Left:  none  Right:  none     Functional Testing:     Left:  Ezequiel's test:  Negative  Patella grind test:  Negative  Q-angle:  Normal       Imaging/Labs/EMG Reviewed:  Imaging Results (last 24 hours)     ** No results found for the last 24 hours. **      None today.    Assessment:  1. Chronic pain of left knee    2. Primary osteoarthritis of left knee        Plan:  1. Discussion was had with the patient regarding exam, assessment and treatment options.    2. Continue use the cane for assistance as needed.  3. Continue with activities as tolerated.  4. Patient does have an appointment this week with Dr. Alvarez for evaluation and treatment of her back issues.   5. Discussed the continue use of her prescribed pain and anti-inflammatory medication prescribed by her PCP.   6. Follow-up as needed for repeat evaluation, sooner symptoms worsen or change.  Once again, we discussed the possibility of Visco supplementation as another treatment modality.  7. Question and concerns answered.      Abi Reno PA-C  09/04/18  1:18 PM

## 2019-12-19 ENCOUNTER — OFFICE VISIT (OUTPATIENT)
Dept: ORTHOPEDIC SURGERY | Facility: CLINIC | Age: 69
End: 2019-12-19

## 2019-12-19 VITALS — OXYGEN SATURATION: 98 % | HEIGHT: 64 IN | BODY MASS INDEX: 39.78 KG/M2 | WEIGHT: 233 LBS | HEART RATE: 78 BPM

## 2019-12-19 DIAGNOSIS — M17.12 PRIMARY OSTEOARTHRITIS OF LEFT KNEE: Primary | ICD-10-CM

## 2019-12-19 DIAGNOSIS — Z96.651 STATUS POST TOTAL RIGHT KNEE REPLACEMENT: ICD-10-CM

## 2019-12-19 PROCEDURE — 99214 OFFICE O/P EST MOD 30 MIN: CPT | Performed by: ORTHOPAEDIC SURGERY

## 2019-12-19 NOTE — PROGRESS NOTES
"    [facility] – [place] Orthopaedic Surgery Clinic Note        Subjective     CC: Follow-up (Patient fell last month and Right knee now painful- S/P Total knee arthroplasty on 2/8/17)      IVONE Gtz is a 69 y.o. female.  Patient is here today for evaluation of her left knee.  Patient sustained a fall and injured her right knee last month.  She is seeing the [facility] pain management folks for her left knee and low back.  She has started a course of Visco supplementation in the left knee.  She has been told that her left knee has no cartilage left in it.  Left knee pain is moderate.  She is here with a  today.  With regards to her right knee, this seems to be getting better.  She has a little bit of difficulty twisting.      ROS:    Constiutional:Pt denies fever, chills, nausea, or vomiting.  MSK:as above        Objective      Past Medical History  Past Medical History:   Diagnosis Date   • Anemia    • Arthritis    • Asthma    • Deaf    • History of complications due to general anesthesia    • Hypertension    • Osteoporosis    • PONV (postoperative nausea and vomiting)    • Primary osteoarthritis of both knees    • Rheumatoid arthritis (CMS/HCC)    • SOB (shortness of breath)    • Wears dentures    • Wears glasses          Physical Exam  Pulse 78   Ht 162 cm (63.78\")   Wt 106 kg (233 lb)   SpO2 98%   BMI 40.27 kg/m²     Body mass index is 40.27 kg/m².    Patient is well nourished and well developed.        Ortho Exam  Peripheral Vascular:    Upper Extremity:   Inspection:  Left--no cyanotic nail beds Right--no cyanotic nail beds   Bilateral:  Pink nail beds with brisk capillary refill   Palpation:  Bilateral radial pulse normal    Musculoskeletal:  Global Assessment:  Overall assessment of Lower Extremity Muscle Strength and Tone:  Left quadriceps--5/5   Left hamstrings--5/5       Left tibialis anterior--5/5  Left gastroc-soleus--5/5  Left EHL --5/5    Lower Extremity:  Knee/Patella:  No digital clubbing " or cyanosis.    Examination of left knee reveals:  Normal deep tendon reflexes, coordination, strength, tone, sensation.  No known fractures or deformities.    Inspection and Palpation:  Left knee:  Tenderness:  Over the medial joint line and moderate severity  Effusion:  1+  Crepitus:  Positive  Pulses:  2+  Ecchymosis:  None  Warmth:  None     ROM:  Left:  Extension: 5     Flexion:120    Instability:    Left:  Lachman Test:  Negative, Varus stress test negative, Valgus stress test negative    Deformities/Malalignments/Discrepancies:    Left:  Genu Varum   Right:  No deformity    Functional Testing:  Ezequiel's test:  Negative  Patella grind test:  Positive  Q-angle:  Normal    Right knee: Stable to varus and valgus at 0 and 30 degrees.  Range of motion 0-115.  No effusion noted.  Incision is clean and free of erythema.      Imaging/Labs/EMG Reviewed:  Imaging Results (Last 24 Hours)     Procedure Component Value Units Date/Time    XR Knee 4+ View Left [[id number]] Resulted:  12/19/19 1235     Updated:  12/19/19 1237    Narrative:       Knee X-Ray    Indication: Pain    Study:  Upright AP, Skiers, Lateral, and Sunrise views of Left knee(s)    Comparison: None    Findings:    Patient appears to have mild to early moderate degenerative changes in the   medial compartment.    There are minimal degenerative changes in the lateral compartment.    There are mild early moderate changes in the patellofemoral compartment.    Patient has overall varus alignment.        Impression:   Mild to early moderate medial compartment and patellofemoral compartment   degnerative changes of the knee       XR Knee 3+ View With [place] Right [[id number]] Resulted:  12/19/19 1232     Updated:  12/19/19 1235    Narrative:         Knee X-ray    Indication: status-post TKA    Study:  AP, Lateral, and Sunrise views of Right knee    Comparison: Right knee 1/11/2018    Findings:  No signs of acute fracture is visualized  No signs of loosening are  appreciated  Components are well aligned    Impression:  Status post Right total knee arthroplasty. No signs of   loosening or fracture.              Assessment    Assessment:  1. Primary osteoarthritis of left knee    2. Status post total right knee replacement        Plan:  1. Recommend over the counter anti-inflammatories for pain and/or swelling  2. Osteoarthritis left knee--we have talked to the patient about the severity of her arthritis which would be moderate only.  I do not recommend arthroplasty or any surgery.  To categorize her knee as having no cartilage left is in overstatement but they may have been telling her this to let her know that arthritis is her main issue and that injections are an option.  I do recommend a Visco supplement.  She should continue where she is at with regards to receiving those injections.  3. Status post right total knee arthroplasty--x-rays look good today.  No evidence of loosening.  Her ligamentous exam is also stable.  We have reassured her in this regard.  Follow-up in 2 years or sooner if necessary.  We talked her today about indefinite antibiotic prophylaxis for dental and invasive procedures.      Milton Romero MD  12/19/19  12:48 PM

## 2020-03-16 ENCOUNTER — TELEPHONE (OUTPATIENT)
Dept: NEUROLOGY | Facility: CLINIC | Age: 70
End: 2020-03-16

## 2020-03-16 NOTE — TELEPHONE ENCOUNTER
I CALLED PATIENT, AND SHE RESCHEDULED FOR 4/1 AT 1:30.  ALSO I AM CALLING THE  TO CANCEL, AND RESCHEDULE.

## 2020-03-16 NOTE — TELEPHONE ENCOUNTER
PT HAS A SCHEDULED APPT TODAY AT 1:30 WITH IFRAH SULTANA. PT ALSO HAS AN  SCHEDULED TO BE AT HER APPT WITH HER. PT DOES NOT KNOW IF SHE SHOULD STILL COME TO HER APPT SINCE THE CORONA VIRUS IS GOING AROUND. PT WANTS A CALL BACK WITH RECOMMENDATION AT EARLIEST CONVENIENCE.    BEST CALL BACK- [phone]

## 2020-05-14 ENCOUNTER — OFFICE VISIT (OUTPATIENT)
Dept: ORTHOPEDIC SURGERY | Facility: CLINIC | Age: 70
End: 2020-05-14

## 2020-05-14 VITALS — HEIGHT: 64 IN | WEIGHT: 236.8 LBS | OXYGEN SATURATION: 96 % | BODY MASS INDEX: 40.43 KG/M2 | HEART RATE: 78 BPM

## 2020-05-14 DIAGNOSIS — Z96.651 STATUS POST TOTAL RIGHT KNEE REPLACEMENT: ICD-10-CM

## 2020-05-14 DIAGNOSIS — M17.12 PRIMARY OSTEOARTHRITIS OF LEFT KNEE: Primary | ICD-10-CM

## 2020-05-14 PROCEDURE — 99213 OFFICE O/P EST LOW 20 MIN: CPT | Performed by: ORTHOPAEDIC SURGERY

## 2020-05-14 PROCEDURE — 20610 DRAIN/INJ JOINT/BURSA W/O US: CPT | Performed by: ORTHOPAEDIC SURGERY

## 2020-05-14 RX ORDER — ATORVASTATIN CALCIUM 20 MG/1
20 TABLET, FILM COATED ORAL DAILY
COMMUNITY
End: 2020-06-25

## 2020-05-14 RX ORDER — FAMOTIDINE 20 MG/1
20 TABLET, FILM COATED ORAL 2 TIMES DAILY
COMMUNITY
End: 2022-09-26

## 2020-05-14 RX ORDER — ASPIRIN 81 MG/1
81 TABLET ORAL 2 TIMES DAILY
COMMUNITY

## 2020-05-14 RX ORDER — LIDOCAINE HYDROCHLORIDE 10 MG/ML
3 INJECTION, SOLUTION EPIDURAL; INFILTRATION; INTRACAUDAL; PERINEURAL
Status: COMPLETED | OUTPATIENT
Start: 2020-05-14 | End: 2020-05-14

## 2020-05-14 RX ORDER — TRIAMCINOLONE ACETONIDE 40 MG/ML
40 INJECTION, SUSPENSION INTRA-ARTICULAR; INTRAMUSCULAR
Status: COMPLETED | OUTPATIENT
Start: 2020-05-14 | End: 2020-05-14

## 2020-05-14 RX ADMIN — TRIAMCINOLONE ACETONIDE 40 MG: 40 INJECTION, SUSPENSION INTRA-ARTICULAR; INTRAMUSCULAR at 15:49

## 2020-05-14 RX ADMIN — LIDOCAINE HYDROCHLORIDE 3 ML: 10 INJECTION, SOLUTION EPIDURAL; INFILTRATION; INTRACAUDAL; PERINEURAL at 15:49

## 2020-05-14 NOTE — PROGRESS NOTES
Procedure   Large Joint Arthrocentesis: L knee  Date/Time: 5/14/2020 3:49 PM  Consent given by: patient  Site marked: site marked  Timeout: Immediately prior to procedure a time out was called to verify the correct patient, procedure, equipment, support staff and site/side marked as required   Supporting Documentation  Indications: pain   Procedure Details  Location: knee - L knee  Preparation: Patient was prepped and draped in the usual sterile fashion  Needle size: 25 G  Approach: anterolateral  Medications administered: 3 mL lidocaine PF 1% 1 %; 40 mg triamcinolone acetonide 40 MG/ML  Patient tolerance: patient tolerated the procedure well with no immediate complications

## 2020-05-14 NOTE — PROGRESS NOTES
"    [facility] [place] Orthopaedic Surgery Clinic Note        Subjective     CC: Follow-up (5 month f/u; Primary osteoarthritis of left knee)      IVONE Gtz is a 69 y.o. female.  Patient is here for follow-up of her left and right knees.  Last seen in December 2019.  She is with a ASL .  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  She had been going to the [facility] pain management center and was getting better but secondary to COVID-19, this close down.  As a result, both of her knees are hurting more.  No new injuries.      ROS:    Constiutional:Pt denies fever, chills, nausea, or vomiting.  MSK:as above        Objective      Past Medical History  Past Medical History:   Diagnosis Date   • Anemia    • Arthritis    • Asthma    • Deaf    • History of complications due to general anesthesia    • Hypertension    • Osteoporosis    • PONV (postoperative nausea and vomiting)    • Primary osteoarthritis of both knees    • Rheumatoid arthritis (CMS/HCC)    • SOB (shortness of breath)    • Wears dentures    • Wears glasses          Physical Exam  Pulse 78   Ht 162 cm (63.78\")   Wt 107 kg (236 lb 12.8 oz)   SpO2 96%   BMI 40.93 kg/m²     Body mass index is 40.93 kg/m².    Patient is well nourished and well developed.        Ortho Exam  Patient has lateral joint line tenderness on the right side  1+ effusion on the right  Healed midline incision on the right  No induration or increased warmth or erythema on the right  On the left, she has medial joint line tenderness  Genu varum  1+ effusion  Negative Ezequiel    Imaging/Labs/EMG Reviewed:  Imaging Results (Last 24 Hours)     ** No results found for the last 24 hours. **          Assessment    Assessment:  1. Primary osteoarthritis of left knee    2. Status post total right knee replacement        Plan:  Recommend over the counter anti-inflammatories for pain and/or swelling  Status post right total knee arthroplasty--x-rays in " December were reassuring overall and showed no signs of loosening.  Plan will be for observation for now.  Recommend strengthening with physical therapy.  With regards to the left knee arthritis--plan will be for corticosteroid injection today.  We briefly discussed possibility of Visco supplementation and she seems fairly loyal to the [facility] people with regards to Visco supplementation injections.  She is also interested in restarting physical therapy with them as well.  I will hold off on any formal prescriptions for now.  Follow-up in 6 to 8 weeks and will see how she is doing overall.      Milton Romero MD  05/14/20  16:48

## 2020-05-18 ENCOUNTER — OFFICE VISIT (OUTPATIENT)
Dept: NEUROLOGY | Facility: CLINIC | Age: 70
End: 2020-05-18

## 2020-05-18 VITALS
OXYGEN SATURATION: 98 % | HEIGHT: 63 IN | SYSTOLIC BLOOD PRESSURE: 136 MMHG | DIASTOLIC BLOOD PRESSURE: 84 MMHG | HEART RATE: 73 BPM | WEIGHT: 233 LBS | BODY MASS INDEX: 41.29 KG/M2

## 2020-05-18 DIAGNOSIS — I63.9 CEREBROVASCULAR ACCIDENT (CVA), UNSPECIFIED MECHANISM (HCC): Primary | ICD-10-CM

## 2020-05-18 PROCEDURE — 99203 OFFICE O/P NEW LOW 30 MIN: CPT | Performed by: NURSE PRACTITIONER

## 2020-05-18 NOTE — PROGRESS NOTES
Subjective:     Patient ID: Ritu Gtz is a 69 y.o. female.    CC:   Chief Complaint   Patient presents with   • Cerebrovascular Accident       HPI:   History of Present Illness     Ms. Gtz is a very pleasant 69-year-old new patient here today for initial neurological evaluation for stroke follow-up.  Patient is deaf, signing  is here with her.  Patient was admitted to [facility] on January 15, 2020 after strokelike symptoms.   and daughter who are also deaf noted that she was having trouble signing with her daughter, she was not signing appropriately.  She was also complaining of numbness and tingling on the right side of her body, she had weakness on the right arm and leg.  She presented to the emergency room at that time, CT of the head showed no acute findings.  She was admitted for observation, MRI of the brain showed an acute infarct in the medial left parietal lobe.  Further work-up included CTA of the head which was unremarkable, CTA of the neck showed no significant carotid stenosis.  During hospitalization total cholesterol level was found to be 101, LDL was 137.  She was sent home on atorvastatin 20 mg as well as aspirin.  She took Plavix for approximately 90 days.,  Echocardiogram was also performed with negative bubble study.  Patient has been doing outpatient physical therapy as well as communicative therapy, she continues to struggle with signing appropriate words at times however this is improving.  She has restarted PT as this was on hold due to COVID-19.  She has chronic back issues, swimming helps her back significantly and she is inquiring about if she can return to swimming.  She is had no new stroke symptoms, she does feel a bit uneasy riding a bicycle, more so just out of fear, she is having no problems with balance and she is had no falls.  Prior to this incident she was not taking either aspirin or atorvastatin.  The following portions of the  patient's history were reviewed and updated as appropriate: allergies, current medications, past family history, past medical history, past social history, past surgical history and problem list.    Past Medical History:   Diagnosis Date   • Anemia    • Arthritis    • Asthma    • Chronic bronchitis (CMS/HCC)    • Deaf    • History of complications due to general anesthesia    • Hypertension    • Osteoporosis    • PONV (postoperative nausea and vomiting)    • Primary osteoarthritis of both knees    • Rheumatoid arthritis (CMS/HCC)    • SOB (shortness of breath)    • Stroke (CMS/HCC)    • Wears dentures    • Wears glasses        Past Surgical History:   Procedure Laterality Date   • APPENDECTOMY     • COLONOSCOPY     • FRACTURE SURGERY      triggered thumb   • HYSTERECTOMY     • PARTIAL KNEE ARTHROPLASTY Right    • [place] TOTAL KNEE ARTHROPLASTY Right 2/8/2017    Procedure: RIGHT TOTAL KNEE ARTHROPLASTY;  Surgeon: Milton Romero MD;  Location: [facility];  Service: Orthopedics   • SHOULDER SURGERY      scope with ligament repair    • TONSILLECTOMY         Social History     Socioeconomic History   • Marital status:      Spouse name: Not on file   • Number of children: Not on file   • Years of education: Not on file   • Highest education level: Not on file   Tobacco Use   • Smoking status: Never Smoker   • Smokeless tobacco: Never Used   Substance and Sexual Activity   • Alcohol use: No   • Drug use: No   • Sexual activity: Not Currently       Family History   Problem Relation Age of Onset   • Cancer Mother    • Depression Mother    • Arthritis Mother    • Bone cancer Mother    • Dementia Mother    • Cancer Father    • Diabetes Father    • Arthritis Father    • Alcohol abuse Father    • Diabetes insipidus Father    • Heart disease Father    • Hypertension Father    • Hyperlipidemia Father    • Stroke Father    • Arthritis Sister         Review of Systems   Constitutional: Negative for chills, fatigue, fever  "and unexpected weight change.   HENT: Negative.  Negative for ear pain, hearing loss, nosebleeds, rhinorrhea and sore throat.    Eyes: Negative for photophobia, pain, discharge, itching and visual disturbance.   Respiratory: Negative for cough, chest tightness, shortness of breath and wheezing.    Cardiovascular: Negative for chest pain, palpitations and leg swelling.   Gastrointestinal: Negative for abdominal pain, blood in stool, constipation, diarrhea, nausea and vomiting.   Genitourinary: Negative for dysuria, frequency, hematuria and urgency.   Musculoskeletal: Negative for arthralgias, back pain, gait problem, joint swelling, myalgias, neck pain and neck stiffness.   Skin: Negative for rash and wound.   Allergic/Immunologic: Negative for environmental allergies and food allergies.   Neurological: Negative for dizziness, tremors, seizures, syncope, facial asymmetry, speech difficulty, weakness, light-headedness, numbness and headaches.   Hematological: Negative for adenopathy. Does not bruise/bleed easily.   Psychiatric/Behavioral: Negative for agitation, confusion, decreased concentration, hallucinations, sleep disturbance and suicidal ideas. The patient is not nervous/anxious.    All other systems reviewed and are negative.       Objective:  /84   Pulse 73   Ht 160 cm (63\")   Wt 106 kg (233 lb)   SpO2 98%   BMI 41.27 kg/m²     Neurologic Exam     Mental Status   Oriented to person, place, and time.   Attention: normal. Concentration: normal.   Level of consciousness: alert  Knowledge: consistent with education.   Able to read. Able to write. Normal comprehension.   Patient is deaf, she uses sign language to communicate     Cranial Nerves   Cranial nerves II through XII intact.     CN II   Visual fields full to confrontation.   Right visual field deficit: none  Left visual field deficit: none     CN III, IV, VI   Pupils are equal, round, and reactive to light.  Extraocular motions are normal. "   Right pupil: Shape: regular. Reactivity: brisk. Consensual response: intact. Accommodation: intact.   Left pupil: Shape: regular. Reactivity: brisk. Consensual response: intact. Accommodation: intact.   CN III: no CN III palsy  CN VI: no CN VI palsy  Nystagmus: none   Upgaze: normal  Downgaze: normal    CN V   Facial sensation intact.     CN VII   Facial expression full, symmetric.     CN IX, X   CN IX normal.   CN X normal.     CN XI   CN XI normal.     CN XII   CN XII normal.   Patient is hearing impaired lifelong     Motor Exam   Muscle bulk: normal  Overall muscle tone: normal  Right arm tone: normal  Left arm tone: normal  Right arm pronator drift: absent  Left arm pronator drift: absent  Right leg tone: normal  Left leg tone: normalShe has a very faint right  strength weakness compared to left     Sensory Exam   Light touch normal.     Gait, Coordination, and Reflexes     Gait  Gait: normal    Coordination   Romberg: negative  Finger to nose coordination: normal  Heel to shin coordination: normal  Tandem walking coordination: normal    Tremor   Resting tremor: absent  Intention tremor: absent  Action tremor: absent    Reflexes   Reflexes 2+ except as noted.   Right plantar: normal  Left plantar: normal  Right Nichols: absent  Left Nichols: absent      Physical Exam   Constitutional: She is oriented to person, place, and time. She appears well-developed and well-nourished. No distress.   HENT:   Head: Normocephalic and atraumatic.   Eyes: Pupils are equal, round, and reactive to light. Conjunctivae and EOM are normal. No scleral icterus.   Neck: Normal range of motion. Neck supple.   Pulmonary/Chest: Effort normal. No respiratory distress.   Neurological: She is alert and oriented to person, place, and time. She has a normal Finger-Nose-Finger Test, a normal Heel to Shin Test, a normal Romberg Test and a normal Tandem Gait Test. Gait normal.   Skin: Skin is warm. Capillary refill takes less than 2  seconds.   Psychiatric: She has a normal mood and affect. Her behavior is normal. Judgment and thought content normal.   Vitals reviewed.      Assessment/Plan:       Ritu was seen today for cerebrovascular accident.    Diagnoses and all orders for this visit:    Cerebrovascular accident (CVA), unspecified mechanism (CMS/HCC)  -     Lipid Panel; Future  -     Comprehensive Metabolic Panel; Future    Ms. Gtz is doing well post CVA in January.  She continues in physical therapy due to mild right-sided weakness that persist, I do see just a slight  strength weakness, overall gait is stable.  She has restarted therapy as this was on hold due to COVID-19 pandemic.  She will continue aspirin and statin, she completed either a 60 or 90-day course of Plavix.  I do not have available her images on disc for review we discussed further stroke symptoms, she is inquiring if she can return to swimming which I feel is acceptable as long as she is cleared through physical therapy, I do recommend that she have someone accompany her while in the pool in the event she becomes weak  To the ER with any new stroke symptoms, we will see her back in about 4 months or sooner if needed, she has any questions concerns or problems she can notify my office ASAP  Discussed signs and symptoms of stroke and when to call 911. Instructed to follow a low fat diet including the Mediterranean diet. Instructed to take all medications daily as prescribed. Encouraged 30 minutes of exercise 3-4 times a week as tolerated. Stay well hydrated. Discussed potential side effects of new medications and signs and symptoms to report. If patient is currently using tobacco products, smoking cessation was encouraged. Reviewed stroke risk factors and stroke prevention plan. Patient and/or family verbalizes understanding and agrees with plan.     [facility] admission records were personally reviewed           Reviewed medications, potential side effects  and signs and symptoms to report. Discussed risk versus benefits of treatment plan with patient and/or family-including medications, labs and radiology that may be ordered. Addressed questions and concerns during visit. Patient and/or family verbalized understanding and agree with plan.         January Nunes, APRN  5/22/2020

## 2020-06-01 ENCOUNTER — TELEPHONE (OUTPATIENT)
Dept: NEUROLOGY | Facility: CLINIC | Age: 70
End: 2020-06-01

## 2020-06-01 NOTE — TELEPHONE ENCOUNTER
PATIENT CALLED AND SAID SHE WAS TO HAVE A REFERRAL TO A CARDIOLOGIST IN [place]. SHE IS AVAILABLE THIS WEEK, NEXT WEEK  OR THE LAST WEEK OF June FOR THE CARDIOLOGIST APPT    SHE ALSO ASK IF THERE WAS A TEST THAT COULD BE DONE TO FIND OUT WHAT CAUSED HER STROKE.

## 2020-06-02 NOTE — TELEPHONE ENCOUNTER
I had suggested echo and holter, the MA was going to call and see if the cardiologist in [place] would see her and do testing or if I needed to put in order for them to do.  Can you look into this?

## 2020-06-02 NOTE — TELEPHONE ENCOUNTER
The cardiologist in [place] ( Dr Chavez [phone]) do not do outside orders. They can do referral for her to see them for consult and then order tests. If you just want her to have tests and no consult then you could send orders over to the hospital.

## 2020-06-03 DIAGNOSIS — I63.9 CEREBROVASCULAR ACCIDENT (CVA), UNSPECIFIED MECHANISM (HCC): Primary | ICD-10-CM

## 2020-06-03 NOTE — TELEPHONE ENCOUNTER
Pt asked to see the cardiologist specifically, referral placed however I was unable to find Dr Chavez in our referral list

## 2020-06-25 ENCOUNTER — OFFICE VISIT (OUTPATIENT)
Dept: ORTHOPEDIC SURGERY | Facility: CLINIC | Age: 70
End: 2020-06-25

## 2020-06-25 VITALS — HEIGHT: 63 IN | OXYGEN SATURATION: 98 % | HEART RATE: 78 BPM | WEIGHT: 237 LBS | BODY MASS INDEX: 41.99 KG/M2

## 2020-06-25 DIAGNOSIS — M17.12 PRIMARY OSTEOARTHRITIS OF LEFT KNEE: Primary | ICD-10-CM

## 2020-06-25 DIAGNOSIS — M25.562 CHRONIC PAIN OF LEFT KNEE: ICD-10-CM

## 2020-06-25 DIAGNOSIS — G89.29 CHRONIC PAIN OF LEFT KNEE: ICD-10-CM

## 2020-06-25 PROCEDURE — 99213 OFFICE O/P EST LOW 20 MIN: CPT | Performed by: ORTHOPAEDIC SURGERY

## 2020-06-25 RX ORDER — ATORVASTATIN CALCIUM 40 MG/1
40 TABLET, FILM COATED ORAL
COMMUNITY
Start: 2020-05-31

## 2020-06-25 NOTE — PROGRESS NOTES
"    [facility] Note        Subjective     CC: Follow-up (6 weeks- Primary osteoarthritis of left knee )      HPI    Ritu Gtz is a 69 y.o. female.  Patient is here to follow-up on her left knee arthritis.  She has an ASL  with her.  She was injected at her last visit on 5/14/2020 and has gotten good relief in the left knee is feeling better overall.  She continues to struggle with sciatica type symptoms and low back pain.      ROS:    Constiutional:Pt denies fever, chills, nausea, or vomiting.  MSK:as above        Objective      Past Medical History  Past Medical History:   Diagnosis Date   • Anemia    • Arthritis    • Asthma    • Chronic bronchitis (CMS/HCC)    • Deaf    • History of complications due to general anesthesia    • Hypertension    • Osteoporosis    • PONV (postoperative nausea and vomiting)    • Primary osteoarthritis of both knees    • Rheumatoid arthritis (CMS/HCC)    • SOB (shortness of breath)    • Stroke (CMS/HCC)    • Wears dentures    • Wears glasses          Physical Exam  Pulse 78   Ht 158.8 cm (62.5\")   Wt 108 kg (237 lb)   SpO2 98%   BMI 42.66 kg/m²     Body mass index is 42.66 kg/m².    Patient is well nourished and well developed.        Ortho Exam  No knee effusion  Range of motion 5-115  Medial joint line tenderness    Imaging/Labs/EMG Reviewed:  Imaging Results (Last 24 Hours)     ** No results found for the last 24 hours. **          Assessment    Assessment:  1. Primary osteoarthritis of left knee    2. Chronic pain of left knee        Plan:  Recommend over the counter anti-inflammatories for pain and/or swelling  Left knee arthritis--improved after corticosteroid injection.  We will leave follow-up open-ended.  Patient has been told that this modality can be repeated down the road if it has given her lasting relief.  If the relief is not all that long-lasting, we can try a course of Visco supplementation.  She will call back if symptoms " recur.      I spent 15 minutes face to face with the patient  with 10 minutes spent counseling on her diagnosis and options for future treatment.      Milton Romero MD  06/25/20  17:09

## 2020-06-29 ENCOUNTER — OFFICE VISIT (OUTPATIENT)
Dept: NEUROLOGY | Facility: CLINIC | Age: 70
End: 2020-06-29

## 2020-06-29 VITALS
SYSTOLIC BLOOD PRESSURE: 110 MMHG | HEART RATE: 71 BPM | BODY MASS INDEX: 41.99 KG/M2 | DIASTOLIC BLOOD PRESSURE: 90 MMHG | OXYGEN SATURATION: 97 % | WEIGHT: 237 LBS | HEIGHT: 63 IN | TEMPERATURE: 97.1 F

## 2020-06-29 DIAGNOSIS — G44.89 OTHER HEADACHE SYNDROME: ICD-10-CM

## 2020-06-29 DIAGNOSIS — I63.9 CEREBROVASCULAR ACCIDENT (CVA), UNSPECIFIED MECHANISM (HCC): Primary | ICD-10-CM

## 2020-06-29 PROCEDURE — 99213 OFFICE O/P EST LOW 20 MIN: CPT | Performed by: NURSE PRACTITIONER

## 2020-06-29 RX ORDER — ERGOCALCIFEROL 1.25 MG/1
50000 CAPSULE ORAL WEEKLY
COMMUNITY

## 2020-06-29 RX ORDER — ASCORBIC ACID 500 MG
500 TABLET ORAL DAILY
COMMUNITY

## 2020-06-29 RX ORDER — AMITRIPTYLINE HYDROCHLORIDE 10 MG/1
TABLET, FILM COATED ORAL
Qty: 60 TABLET | Refills: 5 | Status: SHIPPED | OUTPATIENT
Start: 2020-06-29 | End: 2020-12-01

## 2020-06-29 NOTE — PROGRESS NOTES
Subjective:     Patient ID: Ritu Gtz is a 69 y.o. female.    CC:   Chief Complaint   Patient presents with   • Stroke       HPI:   History of Present Illness     Ms. Gtz is here today for follow-up.  I first saw her in May for hospital follow-up after having a stroke    Patient is deaf, signing  is here with her.     Ms. Gzt was admitted to [facility] on January 15, 2020 after strokelike symptoms.   and daughter who are also deaf noted that she was having trouble signing with her daughter, she was not signing appropriately.  She was also complaining of numbness and tingling on the right side of her body, she had weakness on the right arm and leg.  She presented to the emergency room at that time, CT of the head showed no acute findings.  She was admitted for observation, MRI of the brain showed an acute infarct in the medial left parietal lobe.  Further work-up included CTA of the head which was unremarkable, CTA of the neck showed no significant carotid stenosis.  During hospitalization total cholesterol level was found to be 101, LDL was 137.  She was sent home on atorvastatin 20 mg as well as aspirin.  She took Plavix for approximately 90 days.,  Echocardiogram was also performed with negative bubble study.  When I saw her last she reported she had been doing outpatient physical therapy as well as communicative therapy.  Today she tells me that she is doing better, she has not resumed swimming as pools or not yet open.  She has occasional dizziness when she looks up, she is also complaining of headache on the right side of her head that occurs a couple times a week, resolves with Tylenol.  She is had no recurrent stroke symptoms or weakness    She is had no new stroke symptoms, she does feel a bit uneasy riding a bicycle, more so just out of fear, she is having no problems with balance and she is had no falls.  Prior to this incident she was not taking either aspirin  or atorvastatin.    She remains on aspirin and atorvastatin today, she denies muscle pain.  I had ordered lipid panel and cholesterol to be checked after last visit, she apparently had this done at her primary care office and results are not available in epic    I did schedule her an appointment with cardiology for Holter monitor, she had upcoming appointment next week but was recently called to cancel this appointment, she has yet to reschedule.    The following portions of the patient's history were reviewed and updated as appropriate: allergies, current medications, past family history, past medical history, past social history, past surgical history and problem list.    Past Medical History:   Diagnosis Date   • Anemia    • Arthritis    • Asthma    • Chronic bronchitis (CMS/HCC)    • Deaf    • History of complications due to general anesthesia    • Hypertension    • Osteoporosis    • PONV (postoperative nausea and vomiting)    • Primary osteoarthritis of both knees    • Rheumatoid arthritis (CMS/HCC)    • SOB (shortness of breath)    • Stroke (CMS/HCC)    • Wears dentures    • Wears glasses        Past Surgical History:   Procedure Laterality Date   • APPENDECTOMY     • COLONOSCOPY     • FRACTURE SURGERY      triggered thumb   • HYSTERECTOMY     • PARTIAL KNEE ARTHROPLASTY Right    • [place] TOTAL KNEE ARTHROPLASTY Right 2/8/2017    Procedure: RIGHT TOTAL KNEE ARTHROPLASTY;  Surgeon: Milton Romero MD;  Location: [facility];  Service: Orthopedics   • SHOULDER SURGERY      scope with ligament repair    • TONSILLECTOMY         Social History     Socioeconomic History   • Marital status:      Spouse name: Not on file   • Number of children: Not on file   • Years of education: Not on file   • Highest education level: Not on file   Tobacco Use   • Smoking status: Never Smoker   • Smokeless tobacco: Never Used   Substance and Sexual Activity   • Alcohol use: No   • Drug use: No   • Sexual activity: Not  "Currently       Family History   Problem Relation Age of Onset   • Cancer Mother    • Depression Mother    • Arthritis Mother    • Bone cancer Mother    • Dementia Mother    • Cancer Father    • Diabetes Father    • Arthritis Father    • Alcohol abuse Father    • Diabetes insipidus Father    • Heart disease Father    • Hypertension Father    • Hyperlipidemia Father    • Stroke Father    • Arthritis Sister         Review of Systems   HENT: Negative for tinnitus and trouble swallowing.    Eyes: Negative.    Respiratory: Negative.    Cardiovascular: Negative.    Gastrointestinal: Negative.    Endocrine: Negative.    Genitourinary: Negative.    Musculoskeletal: Negative.    Skin: Negative.    Allergic/Immunologic: Negative.    Neurological: Positive for dizziness ( Ms. certain head position changes, CT of the head neck unremarkable in January), weakness ( Mild right side, improved) and headaches. Negative for tremors, seizures, syncope, facial asymmetry, light-headedness and numbness.   Hematological: Negative.    Psychiatric/Behavioral: Negative.         Objective:  /90   Pulse 71   Temp 97.1 °F (36.2 °C)   Ht 160 cm (63\")   Wt 108 kg (237 lb)   SpO2 97%   BMI 41.98 kg/m²     Neurologic Exam     Mental Status   Oriented to person, place, and time.   Attention: normal. Concentration: normal.   Speech: speech is normal   Level of consciousness: alert  Knowledge: consistent with education.   Able to read. Able to write. Normal comprehension.   Patient alert and oriented x3, she does sign for communication,  here with her       Cranial Nerves   Cranial nerves II through XII intact.     CN II   Visual fields full to confrontation.   Right visual field deficit: none  Left visual field deficit: none     CN III, IV, VI   Pupils are equal, round, and reactive to light.  Extraocular motions are normal.   Right pupil: Shape: regular. Reactivity: brisk. Consensual response: intact. Accommodation: intact. "   Left pupil: Shape: regular. Reactivity: brisk. Consensual response: intact. Accommodation: intact.   CN III: no CN III palsy  CN VI: no CN VI palsy  Nystagmus: none   Upgaze: normal  Downgaze: normal    CN V   Facial sensation intact.     CN VII   Facial expression full, symmetric.     CN VIII   CN VIII normal.     CN IX, X   CN IX normal.   CN X normal.     CN XI   CN XI normal.     CN XII   CN XII normal.     Motor Exam   Muscle bulk: normal  Overall muscle tone: normal  Right arm tone: normal  Left arm tone: normal  Right arm pronator drift: absent  Left arm pronator drift: absent  Right leg tone: normal  Left leg tone: normalShe has very mild  strength weakness on the right compared to left     Sensory Exam   Light touch normal.     Gait, Coordination, and Reflexes     Gait  Gait: normal    Coordination   Romberg: negative  Finger to nose coordination: normal  Heel to shin coordination: normal  Tandem walking coordination: normal    Tremor   Resting tremor: absent  Intention tremor: absent  Action tremor: absent    Reflexes   Reflexes 2+ except as noted.   Right Nichols: absent  Left Nichols: absent      Physical Exam   Constitutional: She is oriented to person, place, and time. She appears well-developed and well-nourished.   HENT:   Head: Normocephalic and atraumatic.   Eyes: Pupils are equal, round, and reactive to light. Conjunctivae and EOM are normal. No scleral icterus.   Neck: Normal range of motion. Neck supple.   Pulmonary/Chest: Effort normal. No respiratory distress.   Neurological: She is alert and oriented to person, place, and time. She has a normal Finger-Nose-Finger Test, a normal Heel to Shin Test, a normal Romberg Test and a normal Tandem Gait Test. Gait normal.   Skin: Skin is warm.   Psychiatric: She has a normal mood and affect. Her speech is normal and behavior is normal. Judgment and thought content normal.   Vitals reviewed.      Assessment/Plan:       Ritu was seen today for  stroke.    Diagnoses and all orders for this visit:    Cerebrovascular accident (CVA), unspecified mechanism (CMS/HCC)  -     MRI Brain Without Contrast    Other headache syndrome  -     MRI Brain Without Contrast  -     amitriptyline (ELAVIL) 10 MG tablet; Take 1 PO qhs, may increase to 2 po Qhs after 3 weeks if needed    Ms. Gtz is here today for follow-up, she is status post left parietal stroke in January.  She has very mild right  strength weakness, this is improved.  She is had no new stroke symptoms.  She is having a couple headaches per week which concerned her as she typically did not have headaches in the past.  I did advise her this could be from her old stroke nonetheless she is a bit anxious and would like to have an repeat MRI of her brain, order placed for her.  I have advised her if her headache worsens or she has any other neurological changes or acute deficits to go to the emergency room  We did discuss medication options for her headache she would like to try low-dose amitriptyline, instructions given on proper use.  Patient is to keep upcoming cardiology appointment  Would like to see her back in 3 months to reassess headache, I will certainly see her sooner if needed, she is instructed to notify me with any questions concerns or problems prior to her follow-up appointment         Reviewed medications, potential side effects and signs and symptoms to report. Discussed risk versus benefits of treatment plan with patient and/or family-including medications, labs and radiology that may be ordered. Addressed questions and concerns during visit. Patient and/or family verbalized understanding and agree with plan.        January Nunes, APRN  6/29/2020

## 2020-07-10 ENCOUNTER — HOSPITAL ENCOUNTER (OUTPATIENT)
Dept: MRI IMAGING | Facility: HOSPITAL | Age: 70
Discharge: HOME | End: 2020-07-10
Admitting: NURSE PRACTITIONER

## 2020-07-10 PROCEDURE — 70551 MRI BRAIN STEM W/O DYE: CPT

## 2020-07-15 ENCOUNTER — TELEPHONE (OUTPATIENT)
Dept: NEUROLOGY | Facility: CLINIC | Age: 70
End: 2020-07-15

## 2020-07-15 NOTE — TELEPHONE ENCOUNTER
Provider: IFRAH SULTANA,    Caller: CHAPARRO VELAZQUEZ  Relationship to Patient: SELF  Phone Number: [phone]      Reason for Call:   PT IS WANTING THE RESULTS/EXPLAINATION OF THE MRI THAT WAS DONE ON 7-10-20. ALSO WANTS THE MRI OF THE BRAIN DISC TO BE SENT TO HER CARDIOLOGIST/VASCULAR DR. INNA STALEY   [place] [address]  PHONE: [phone]        PT IS ALSO WANTING A COPY OF THE MRI DISC HERSELF AND WANTS TO KNOW IF SHE HAS TO SIGN A WAVIER FOR IT?  PLEASE CALL HER BACK [phone]

## 2020-07-16 NOTE — TELEPHONE ENCOUNTER
Patient is deaf but has a service that interpretates for her. They will leave her the message that I mailed her results to her and she will have to go and get the MRI disc that we cannot get the disc for her.

## 2020-08-13 ENCOUNTER — TELEPHONE (OUTPATIENT)
Dept: NEUROLOGY | Facility: CLINIC | Age: 70
End: 2020-08-13

## 2020-08-13 NOTE — TELEPHONE ENCOUNTER
amitriptyline (ELAVIL) 10 MG tablet  MAKES PATIENTS FEEL   SPACED OUT, LIGHT HEADED AND PATIENT DOES NOT LIKE THAT FEELING. SHE WOULD LIKE TO BE TAKEN OFF THE MEDICATION , PT STOPPED THE MEDICATION ON HER OWN.     SHE ALSO WANTED TO MAKE IFRAH AWARE SHE WILL FINISH HER HOLTER MONITOR ON Saturday 8-15-20.    PLEASE ADVISE.       Ritu Gtz (Self) [phone] (H)     YOU CAN CONTACT THOUGH [employer] AS WELL. OR TEXT THOUGH HER MOBLIE [phone] DUE TO BEING DEAF.

## 2020-08-17 NOTE — TELEPHONE ENCOUNTER
The amitriptyline, and essentially most medications that we can give for headache prevention, can cause patients to feel a little bit sedated when they first start them, this generally improves.  We can try different medication, the next choice would be a seizure medication that can sometimes cause problems with short-term memory but we can try low-dose if she would like

## 2020-09-10 ENCOUNTER — OFFICE VISIT (OUTPATIENT)
Dept: NEUROLOGY | Facility: CLINIC | Age: 70
End: 2020-09-10

## 2020-09-10 ENCOUNTER — LAB (OUTPATIENT)
Dept: LAB | Facility: HOSPITAL | Age: 70
End: 2020-09-10

## 2020-09-10 VITALS
BODY MASS INDEX: 41.99 KG/M2 | WEIGHT: 237 LBS | OXYGEN SATURATION: 96 % | DIASTOLIC BLOOD PRESSURE: 90 MMHG | HEIGHT: 63 IN | TEMPERATURE: 96.9 F | SYSTOLIC BLOOD PRESSURE: 150 MMHG | HEART RATE: 80 BPM

## 2020-09-10 DIAGNOSIS — E66.01 CLASS 3 SEVERE OBESITY WITH SERIOUS COMORBIDITY AND BODY MASS INDEX (BMI) OF 40.0 TO 44.9 IN ADULT, UNSPECIFIED OBESITY TYPE (HCC): ICD-10-CM

## 2020-09-10 DIAGNOSIS — I63.9 CEREBROVASCULAR ACCIDENT (CVA), UNSPECIFIED MECHANISM (HCC): ICD-10-CM

## 2020-09-10 DIAGNOSIS — R20.0 NUMBNESS IN FEET: Primary | ICD-10-CM

## 2020-09-10 DIAGNOSIS — R20.0 NUMBNESS IN FEET: ICD-10-CM

## 2020-09-10 DIAGNOSIS — G56.02 CARPAL TUNNEL SYNDROME ON LEFT: ICD-10-CM

## 2020-09-10 LAB
ALBUMIN SERPL-MCNC: 4.1 G/DL (ref 3.5–5.2)
ALBUMIN/GLOB SERPL: 1 G/DL
ALP SERPL-CCNC: 146 U/L (ref 39–117)
ALT SERPL W P-5'-P-CCNC: 19 U/L (ref 1–33)
ANION GAP SERPL CALCULATED.3IONS-SCNC: 8.5 MMOL/L (ref 5–15)
AST SERPL-CCNC: 18 U/L (ref 1–32)
BILIRUB SERPL-MCNC: 0.5 MG/DL (ref 0–1.2)
BUN SERPL-MCNC: 14 MG/DL (ref 8–23)
BUN/CREAT SERPL: 14.6 (ref 7–25)
CALCIUM SPEC-SCNC: 10.3 MG/DL (ref 8.6–10.5)
CHLORIDE SERPL-SCNC: 102 MMOL/L (ref 98–107)
CHOLEST SERPL-MCNC: 128 MG/DL (ref 0–200)
CO2 SERPL-SCNC: 27.5 MMOL/L (ref 22–29)
CREAT SERPL-MCNC: 0.96 MG/DL (ref 0.57–1)
FOLATE SERPL-MCNC: 10.8 NG/ML (ref 4.78–24.2)
GFR SERPL CREATININE-BSD FRML MDRD: 57 ML/MIN/1.73
GLOBULIN UR ELPH-MCNC: 4 GM/DL
GLUCOSE SERPL-MCNC: 90 MG/DL (ref 65–99)
HDLC SERPL-MCNC: 45 MG/DL (ref 40–60)
LDLC SERPL CALC-MCNC: 63 MG/DL (ref 0–100)
LDLC/HDLC SERPL: 1.4 {RATIO}
POTASSIUM SERPL-SCNC: 4.1 MMOL/L (ref 3.5–5.2)
PROT SERPL-MCNC: 8.1 G/DL (ref 6–8.5)
SODIUM SERPL-SCNC: 138 MMOL/L (ref 136–145)
TRIGL SERPL-MCNC: 100 MG/DL (ref 0–150)
VIT B12 BLD-MCNC: 610 PG/ML (ref 211–946)
VLDLC SERPL-MCNC: 20 MG/DL (ref 5–40)

## 2020-09-10 PROCEDURE — 36415 COLL VENOUS BLD VENIPUNCTURE: CPT

## 2020-09-10 PROCEDURE — 86334 IMMUNOFIX E-PHORESIS SERUM: CPT

## 2020-09-10 PROCEDURE — 82746 ASSAY OF FOLIC ACID SERUM: CPT

## 2020-09-10 PROCEDURE — 82607 VITAMIN B-12: CPT

## 2020-09-10 PROCEDURE — 82784 ASSAY IGA/IGD/IGG/IGM EACH: CPT

## 2020-09-10 PROCEDURE — 99215 OFFICE O/P EST HI 40 MIN: CPT | Performed by: NURSE PRACTITIONER

## 2020-09-10 PROCEDURE — 84207 ASSAY OF VITAMIN B-6: CPT

## 2020-09-10 PROCEDURE — 80053 COMPREHEN METABOLIC PANEL: CPT

## 2020-09-10 PROCEDURE — 80061 LIPID PANEL: CPT

## 2020-09-10 PROCEDURE — 84155 ASSAY OF PROTEIN SERUM: CPT

## 2020-09-10 PROCEDURE — 84165 PROTEIN E-PHORESIS SERUM: CPT

## 2020-09-10 PROCEDURE — 83921 ORGANIC ACID SINGLE QUANT: CPT

## 2020-09-10 NOTE — PROGRESS NOTES
Subjective:     Patient ID: Ritu Gtz is a 70 y.o. female.    CC:   Chief Complaint   Patient presents with   • Stroke       HPI:   History of Present Illness     Ms. Gtz is here today for follow-up.  I last saw her in August.  Ms. Gtz is here today for follow-up.  I first saw her in May for hospital follow-up after having a stroke     Patient is deaf, signing  is here with her.      Ms. Gtz was admitted to [facility] on January 15, 2020 after strokelike symptoms.   and daughter who are also deaf noted that she was having trouble signing with her daughter, she was not signing appropriately.  She was also complaining of numbness and tingling on the right side of her body, she had weakness on the right arm and leg.  She presented to the emergency room at that time, CT of the head showed no acute findings.  She was admitted for observation, MRI of the brain showed an acute infarct in the medial left parietal lobe.  Further work-up included CTA of the head which was unremarkable, CTA of the neck showed no significant carotid stenosis.  During hospitalization total cholesterol level was found to be 101, LDL was 137.  She was sent home on atorvastatin 20 mg as well as aspirin.  She took Plavix for approximately 90 days.,  Echocardiogram was also performed with negative bubble study.  When I saw her last she reported she had been doing outpatient physical therapy as well as communicative therapy.  Today she tells me that she is doing better, she has not resumed swimming as pools or not yet open.  She has occasional dizziness when she looks up, she is also complaining of headache on the right side of her head that occurs a couple times a week, resolves with Tylenol.  She is had no recurrent stroke symptoms or weakness     When I saw her last she reported no new stroke symptoms, she was  having no problems with balance and she is had no falls.  Prior to this incident she was  "not taking either aspirin or atorvastatin.     She remains on aspirin and atorvastatin today  I had ordered lipid panel and cholesterol to be checked after last visit, she apparently had this done at her primary care office and results are not available in epic     To round out stroke work-up I had sent her to a cardiologist in [facility] for a Holter, she tells me today that she had \"small bouts of atrial fibrillation but they did not feel this was significant\".  She does not have a follow-up with cardiology    She is here today with many questions, she has her son on [place].  She wanted me to tell her if her family was at risk for stroke due to her stroke.  She wanted me to explain to her what chronic small vessel changes were on MRI which I have done.  She was wondering if there was something that could be done to reverse scarring which I told her at this point we just decrease risk factors by using the aspirin and the atorvastatin which she remains on today.  She is also stating that she has chronic numbness and tingling in her feet, ongoing for years from back issues.  She sees Dr. Alvarez at .  She is also complaining of swelling in her feet, she has not discussed this with her primary care  She wanted me to talk to her about diet, she feels like she eats good but is unable to lose weight.  When I saw her last she was having some headaches, I started her on amitriptyline 10 mg at night.  She reports that this did help her headache, she slept well and it made her feel less anxious however she felt \"loopy\".  So she stopped the medication after 2 weeks.  She was taking this at about 9:30 at night    The following portions of the patient's history were reviewed and updated as appropriate: allergies, current medications, past family history, past medical history, past social history, past surgical history and problem list.    Past Medical History:   Diagnosis Date   • Anemia    • Arthritis    • Asthma    • " Chronic bronchitis (CMS/HCC)    • Deaf    • History of complications due to general anesthesia    • Hypertension    • Osteoporosis    • PONV (postoperative nausea and vomiting)    • Primary osteoarthritis of both knees    • Rheumatoid arthritis (CMS/HCC)    • SOB (shortness of breath)    • Stroke (CMS/HCC)    • Wears dentures    • Wears glasses        Past Surgical History:   Procedure Laterality Date   • APPENDECTOMY     • COLONOSCOPY     • FRACTURE SURGERY      triggered thumb   • HYSTERECTOMY     • PARTIAL KNEE ARTHROPLASTY Right    • [place] TOTAL KNEE ARTHROPLASTY Right 2/8/2017    Procedure: RIGHT TOTAL KNEE ARTHROPLASTY;  Surgeon: Milton Romero MD;  Location: [facility];  Service: Orthopedics   • SHOULDER SURGERY      scope with ligament repair    • TONSILLECTOMY         Social History     Socioeconomic History   • Marital status:      Spouse name: Not on file   • Number of children: Not on file   • Years of education: Not on file   • Highest education level: Not on file   Tobacco Use   • Smoking status: Never Smoker   • Smokeless tobacco: Never Used   Substance and Sexual Activity   • Alcohol use: No   • Drug use: No   • Sexual activity: Not Currently       Family History   Problem Relation Age of Onset   • Cancer Mother    • Depression Mother    • Arthritis Mother    • Bone cancer Mother    • Dementia Mother    • Cancer Father    • Diabetes Father    • Arthritis Father    • Alcohol abuse Father    • Diabetes insipidus Father    • Heart disease Father    • Hypertension Father    • Hyperlipidemia Father    • Stroke Father    • Arthritis Sister         Review of Systems   Constitutional: Negative.    Eyes: Negative.    Respiratory: Negative.    Cardiovascular: Positive for leg swelling (intermittently).   Gastrointestinal: Negative.    Endocrine: Negative.    Genitourinary: Negative.    Musculoskeletal: Negative.    Skin: Negative.    Allergic/Immunologic: Negative.    Neurological: Positive for  "numbness (feet/chronic) and headaches (intermittent right side, repeat MRI stable). Negative for dizziness, tremors, seizures, syncope, facial asymmetry, speech difficulty, weakness and light-headedness.   Hematological: Negative.    Psychiatric/Behavioral: Negative.         Objective:  /90   Pulse 80   Temp 96.9 °F (36.1 °C)   Ht 160 cm (63\")   Wt 108 kg (237 lb)   SpO2 96%   BMI 41.98 kg/m²     Neurologic Exam     Mental Status   Oriented to person, place, and time.   Level of consciousness: alert  Patient is deaf, she uses sign language,  present     Cranial Nerves   Cranial nerves II through XII intact.     CN III, IV, VI   Pupils are equal, round, and reactive to light.  Extraocular motions are normal.     Motor Exam   Muscle bulk: normal  Overall muscle tone: normal  Right arm tone: normal  Left arm tone: normal  Right arm pronator drift: absent  Left arm pronator drift: absent  Right leg tone: normal  Left leg tone: normal    Strength   Strength 5/5 throughout.     Sensory Exam   Light touch normal.   Right leg vibration: normal  Left leg vibration: normal  Slight decreased pinprick sensation in the left plantar aspect of the foot, she has hypersensitivity to pinprick on the right  Full vibratory sensation in both lower extremities     Gait, Coordination, and Reflexes     Gait  Gait: normal    Coordination   Finger to nose coordination: normal    Tremor   Resting tremor: absent  Intention tremor: absent  Action tremor: absent    Reflexes   Right Nichols: absent  Left Nichols: absent      Physical Exam   Constitutional: She is oriented to person, place, and time. She appears well-developed and well-nourished.   Obese white female   HENT:   Head: Normocephalic and atraumatic.   Eyes: Pupils are equal, round, and reactive to light. EOM are normal.   Neck: Neck supple.   Neurological: She is alert and oriented to person, place, and time. She has normal strength. She has a normal " "Finger-Nose-Finger Test. Gait normal.   Vitals reviewed.      Assessment/Plan:       Ritu was seen today for stroke.    Diagnoses and all orders for this visit:    Numbness in feet  -     Methylmalonic Acid, Serum; Future  -     Vitamin B12; Future  -     Folate; Future  -     Vitamin B6 (Pyridoxine); Future  -     YEE + PE; Future  -     Comprehensive Metabolic Panel; Future  -     EMG & Nerve Conduction Test    Carpal tunnel syndrome on left  -     EMG & Nerve Conduction Test    Class 3 severe obesity with serious comorbidity and body mass index (BMI) of 40.0 to 44.9 in adult, unspecified obesity type (CMS/HCC)  -     Ambulatory Referral to Nutrition Services    Patient is here today with her , her son is on Skype as well.  She has many questions about general health concerns including lower extremity edema which I have recommended that she follow with her primary care.  She also wants recommendations on her diet, she finds it hard to lose weight.  Referral placed to a dietitian in [facility].  She complains of chronic tingling in her feet with numbness as well, she is on gabapentin, she sees neurosurgery at  for chronic lumbar disc disease.  Will check neuropathy labs and EMG, she also has occasional tingling of her left hand that is positional, more so when she is typing, she does have a positive Tinel at the left wrist, likely CTS.  I have discussed with her in depth about her MRI findings, things that she can do to reduce stroke risk, continue aspirin and Lipitor  She had a Holter monitor in [place] and was told that she \"had some atrial fibrillation\" but it was not significant, I am going to call for this report  She continues to have some intermittent headaches, these were improved with amitriptyline but she felt \"loopy\".  I recommend that she try to restart at half the dose, take earlier in the evening.  After 3 to 4 weeks or so if she is feeling stable on the medication and if needed " she may go to the whole pill.  At this time I will see her back in about 4 months, sooner if needed  Total time of visit 1 hour 15 minutes, patient had many questions about general health concerns, stroke risk, she wanted full explanation of her MRI findings, etiology of her stroke which is unknown at this time.  She wanted to talk to me about risk of her family having strokes that she has had a stroke, I explained to her that there are some hereditary components but not unable to give her an exact risk estimate for her children             Reviewed medications, potential side effects and signs and symptoms to report. Discussed risk versus benefits of treatment plan with patient and/or family-including medications, labs and radiology that may be ordered. Addressed questions and concerns during visit. Patient and/or family verbalized understanding and agree with plan.    AS THE PROVIDER, I PERSONALLY WORE PPE DURING ENTIRE FACE TO FACE ENCOUNTER IN CLINIC WITH THE PATIENT. PATIENT ALSO WORE PPE DURING ENTIRE FACE TO FACE ENCOUNTER EXCEPT FOR A MAX OF 30 SECONDS DURING NEUROLOGICAL EVALUATION OF CRANIAL NERVES AND THEN MASK WAS PLACED BACK OVER PATIENT FACE FOR REMAINDER OF VISIT. I WASHED MY HANDS BEFORE AND AFTER VISIT.      January Nunes, APRN  9/10/2020

## 2020-09-11 LAB
ALBUMIN SERPL-MCNC: 3.6 G/DL (ref 2.9–4.4)
ALBUMIN/GLOB SERPL: 1 {RATIO} (ref 0.7–1.7)
ALPHA1 GLOB FLD ELPH-MCNC: 0.3 G/DL (ref 0–0.4)
ALPHA2 GLOB SERPL ELPH-MCNC: 1.1 G/DL (ref 0.4–1)
B-GLOBULIN SERPL ELPH-MCNC: 1.3 G/DL (ref 0.7–1.3)
GAMMA GLOB SERPL ELPH-MCNC: 1.2 G/DL (ref 0.4–1.8)
GLOBULIN SER CALC-MCNC: 3.9 G/DL (ref 2.2–3.9)
IGA SERPL-MCNC: 437 MG/DL (ref 87–352)
IGG SERPL-MCNC: 1154 MG/DL (ref 586–1602)
IGM SERPL-MCNC: 68 MG/DL (ref 26–217)
INTERPRETATION SERPL IEP-IMP: ABNORMAL
Lab: ABNORMAL
M-SPIKE: ABNORMAL G/DL
PROT SERPL-MCNC: 7.5 G/DL (ref 6–8.5)

## 2020-09-11 NOTE — PROGRESS NOTES
Please let pt know her cholesterol levels look good, continue cholesterol medication  Alk phos slightly elevated, non specific, she have PCP follow this  b12 and folic acid normal  Fax copy of labs to PCP

## 2020-09-13 LAB — VIT B6 SERPL-MCNC: 6.3 UG/L (ref 2–32.8)

## 2020-09-14 LAB
Lab: NORMAL
METHYLMALONATE SERPL-SCNC: 134 NMOL/L (ref 0–378)

## 2020-10-14 ENCOUNTER — TELEPHONE (OUTPATIENT)
Dept: NEUROLOGY | Facility: CLINIC | Age: 70
End: 2020-10-14

## 2020-10-14 NOTE — TELEPHONE ENCOUNTER
JOSE ALFREDO FROM [facility] CALLED NEEDING ADDITIONAL DX FOR DIETARY CONSULT ORDER THAT [facility] SENT OVER FOR PT . PLEASE ADVISE      CALL BACK- [phone]

## 2020-10-15 NOTE — TELEPHONE ENCOUNTER
S/W JOSE ALFREDO WHOM STATED SHE NEEDED A VERBAL FOR THE DX OF REF, PER MARIA ANTON[facility] IS OUT OF OFFICE UNTIL 10-20-20 AND WE HAVE ASKED JOSE ALFREDO TO DEFER UNTIL 10-

## 2020-11-04 ENCOUNTER — APPOINTMENT (OUTPATIENT)
Dept: NEUROLOGY | Facility: HOSPITAL | Age: 70
End: 2020-11-04

## 2020-11-11 ENCOUNTER — HOSPITAL ENCOUNTER (OUTPATIENT)
Dept: NEUROLOGY | Facility: HOSPITAL | Age: 70
Discharge: HOME | End: 2020-11-11
Admitting: NURSE PRACTITIONER

## 2020-11-11 PROCEDURE — 95911 NRV CNDJ TEST 9-10 STUDIES: CPT

## 2020-11-11 PROCEDURE — 95886 MUSC TEST DONE W/N TEST COMP: CPT

## 2020-12-01 ENCOUNTER — OFFICE VISIT (OUTPATIENT)
Dept: NEUROLOGY | Facility: CLINIC | Age: 70
End: 2020-12-01

## 2020-12-01 VITALS
HEIGHT: 63 IN | DIASTOLIC BLOOD PRESSURE: 98 MMHG | BODY MASS INDEX: 42.7 KG/M2 | SYSTOLIC BLOOD PRESSURE: 134 MMHG | TEMPERATURE: 96.8 F | HEART RATE: 83 BPM | OXYGEN SATURATION: 96 % | WEIGHT: 241 LBS

## 2020-12-01 DIAGNOSIS — G56.02 CARPAL TUNNEL SYNDROME OF LEFT WRIST: Primary | ICD-10-CM

## 2020-12-01 DIAGNOSIS — Z86.73 HISTORY OF CVA (CEREBROVASCULAR ACCIDENT): ICD-10-CM

## 2020-12-01 DIAGNOSIS — G62.89 NEUROPATHY, PERIPHERAL AXONAL: ICD-10-CM

## 2020-12-01 PROCEDURE — 99213 OFFICE O/P EST LOW 20 MIN: CPT | Performed by: NURSE PRACTITIONER

## 2020-12-01 NOTE — PROGRESS NOTES
Subjective:     Patient ID: Ritu Gtz is a 70 y.o. female.    CC:   Chief Complaint   Patient presents with   • Stroke       HPI:   History of Present Illness       Ms. Gtz is here today for follow-up.  I first saw her in May for hospital follow-up after having a stroke     Patient is deaf, signing  is here with her.      Ms. Gtz was admitted to [facility] on January 15, 2020 after strokelike symptoms.   and daughter who are also deaf noted that she was having trouble signing with her daughter, she was not signing appropriately.  She was also complaining of numbness and tingling on the right side of her body, she had weakness on the right arm and leg.  She presented to the emergency room at that time, CT of the head showed no acute findings.  She was admitted for observation, MRI of the brain showed an acute infarct in the medial left parietal lobe.  Further work-up included CTA of the head which was unremarkable, CTA of the neck showed no significant carotid stenosis.  During hospitalization total cholesterol level was found to be 101, LDL was 137.  She was sent home on atorvastatin 20 mg as well as aspirin.  She took Plavix for approximately 90 days.,  Echocardiogram was also performed with negative bubble study.  When I saw her last she reported she had been doing outpatient physical therapy as well as communicative therapy.  Today she tells me that she is doing better, she has not resumed swimming as pools or not yet open.  She has occasional dizziness when she looks up, she is also complaining of headache on the right side of her head that occurs a couple times a week, resolves with Tylenol.  She is had no recurrent stroke symptoms or weakness     When I saw her last she reported no new stroke symptoms, she was  having no problems with balance and she is had no falls.  Prior to this incident she was not taking either aspirin or atorvastatin.     She remains on  "aspirin and atorvastatin today  I had ordered lipid panel and cholesterol to be checked after last visit, she apparently had this done at her primary care office and results are not available in epic     To round out stroke work-up I had sent her to a cardiologist in [facility] for a Holter, which was read as normal she does not have a follow-up with cardiology     At last visit she had many questions for me,, she has her son on Skype.  She wanted me to tell her if her family was at risk for stroke due to her stroke.  She wanted me to explain to her what chronic small vessel changes were on MRI which I have done.  She was wondering if there was something that could be done to reverse scarring which I told her at this point we just decrease risk factors by using the aspirin and the atorvastatin which she remains on today.  She is also stating that she has chronic numbness and tingling in her feet, ongoing for years from back issues.  She sees Dr. Alvarez at .  She is also complaining of swelling in her feet, she has not discussed this with her primary care  She wanted me to talk to her about diet, she feels like she eats good but is unable to lose weight.  When I saw her last she was having some headaches, I started her on amitriptyline 10 mg at night.  She reports that this did help her headache, she slept well and it made her feel less anxious however she felt \"loopy\".  So she stopped the medication after 2 weeks.     Today she is here for follow-up, when I saw her last we did get neuropathic labs, are more essentially unremarkable, EMG showed a moderate axonal peripheral neuropathy and a mild to moderate left carpal tunnel  She is on gabapentin through Dr. Alvarez, she has stopped the amitriptyline, she reports that her headaches are completely resolved, she has rare occasional mild headache  She is had no new stroke symptoms     The following portions of the patient's history were reviewed and updated as " appropriate: allergies, current medications, past family history, past medical history, past social history, past surgical history and problem list.    Past Medical History:   Diagnosis Date   • Anemia    • Arthritis    • Asthma    • Chronic bronchitis (CMS/HCC)    • Deaf    • History of complications due to general anesthesia    • Hypertension    • Osteoporosis    • PONV (postoperative nausea and vomiting)    • Primary osteoarthritis of both knees    • Rheumatoid arthritis (CMS/HCC)    • SOB (shortness of breath)    • Stroke (CMS/HCC)    • Wears dentures    • Wears glasses        Past Surgical History:   Procedure Laterality Date   • APPENDECTOMY     • COLONOSCOPY     • FRACTURE SURGERY      triggered thumb   • HYSTERECTOMY     • PARTIAL KNEE ARTHROPLASTY Right    • [place] TOTAL KNEE ARTHROPLASTY Right 2/8/2017    Procedure: RIGHT TOTAL KNEE ARTHROPLASTY;  Surgeon: Mliton Romero MD;  Location: [facility];  Service: Orthopedics   • SHOULDER SURGERY      scope with ligament repair    • TONSILLECTOMY         Social History     Socioeconomic History   • Marital status:      Spouse name: Not on file   • Number of children: Not on file   • Years of education: Not on file   • Highest education level: Not on file   Tobacco Use   • Smoking status: Never Smoker   • Smokeless tobacco: Never Used   Substance and Sexual Activity   • Alcohol use: No   • Drug use: No   • Sexual activity: Not Currently       Family History   Problem Relation Age of Onset   • Cancer Mother    • Depression Mother    • Arthritis Mother    • Bone cancer Mother    • Dementia Mother    • Cancer Father    • Diabetes Father    • Arthritis Father    • Alcohol abuse Father    • Diabetes insipidus Father    • Heart disease Father    • Hypertension Father    • Hyperlipidemia Father    • Stroke Father    • Arthritis Sister         Review of Systems   Constitutional: Negative.    HENT: Negative.    Eyes: Negative.    Respiratory: Negative.   "  Cardiovascular: Negative.    Gastrointestinal: Negative.    Endocrine: Negative.    Genitourinary: Negative.    Musculoskeletal: Negative.    Skin: Negative.    Allergic/Immunologic: Negative.    Neurological: Positive for dizziness and numbness. Negative for tremors, seizures, syncope, facial asymmetry, weakness, light-headedness and headaches.   Hematological: Negative.    Psychiatric/Behavioral: Negative.         Objective:  /98   Pulse 83   Temp 96.8 °F (36 °C)   Ht 160 cm (63\")   Wt 109 kg (241 lb)   SpO2 96%   BMI 42.69 kg/m²     Neurologic Exam     Mental Status   Oriented to person, place, and time.   Patient is alert and oriented, she is nonverbal, patient is deaf and uses a      Cranial Nerves   Cranial nerves II through XII intact.     CN III, IV, VI   Pupils are equal, round, and reactive to light.    Motor Exam   Muscle bulk: normal  Overall muscle tone: normal  Right arm pronator drift: absent  Left arm pronator drift: absent    Strength   Strength 5/5 throughout.     Gait, Coordination, and Reflexes     Gait  Gait: normal    Coordination   Romberg: negative  Finger to nose coordination: normal  Heel to shin coordination: normal  Tandem walking coordination: normal    Tremor   Resting tremor: absent  Intention tremor: absent  Action tremor: absent    Reflexes   Reflexes 2+ except as noted.   Right Nichols: absent  Left Nichols: absent      Physical Exam  Vitals signs reviewed.   Constitutional:       General: She is not in acute distress.     Appearance: She is well-developed.      Comments: Morbidly obese white female   HENT:      Head: Normocephalic and atraumatic.      Mouth/Throat:      Mouth: Mucous membranes are moist.   Eyes:      General: No scleral icterus.     Extraocular Movements: Extraocular movements intact.      Conjunctiva/sclera: Conjunctivae normal.      Pupils: Pupils are equal, round, and reactive to light.   Neck:      Musculoskeletal: Normal " range of motion and neck supple.   Pulmonary:      Effort: Pulmonary effort is normal. No respiratory distress.   Skin:     General: Skin is warm.      Capillary Refill: Capillary refill takes less than 2 seconds.   Neurological:      Mental Status: She is alert and oriented to person, place, and time.      Coordination: Finger-Nose-Finger Test, Heel to Shin Test and Romberg Test normal.      Gait: Gait is intact. Tandem walk normal.      Deep Tendon Reflexes: Strength normal.   Psychiatric:         Mood and Affect: Mood normal.         Behavior: Behavior normal.         Thought Content: Thought content normal.         Judgment: Judgment normal.         Assessment/Plan:       Diagnoses and all orders for this visit:    1. Carpal tunnel syndrome of left wrist (Primary)  -      Wrist Hand Orthosis, Wrist Extension Control Cock-up    2. Neuropathy, peripheral axonal  Comments:  On gabapentin through PCP and Dr. Alvarez    3. History of CVA (cerebrovascular accident)  Comments:  Continue aspirin and statin aspirin and statin, recommend weight loss and healthy diet    Cock-up splint given for wrist, if symptoms continue or worsen recommend referral to orthopedics for carpal tunnel evaluation  Long discussion about possible etiologies of her neuropathy, patient does have history of back issues and has had surgery through Dr. Alvarez.  She is currently on gabapentin.  We have also discussed possibility of idiopathic neuropathy  Recommend weight loss, strict blood pressure and glucose control and other secondary stroke risk factor reduction methods  Continue her aspirin and statin  Follow-up 6 months or sooner if needed  Discussed signs and symptoms of stroke and when to call 911. Instructed to follow a low fat diet including the Mediterranean diet. Instructed to take all medications daily as prescribed. Encouraged 30 minutes of exercise 3-4 times a week as tolerated. Stay well hydrated. Discussed potential side effects of  new medications and signs and symptoms to report. If patient is currently using tobacco products, smoking cessation was encouraged. Reviewed stroke risk factors and stroke prevention plan. Patient and/or family verbalizes understanding and agrees with plan.            Reviewed medications, potential side effects and signs and symptoms to report. Discussed risk versus benefits of treatment plan with patient and/or family-including medications, labs and radiology that may be ordered. Addressed questions and concerns during visit. Patient and/or family verbalized understanding and agree with plan.    AS THE PROVIDER, I PERSONALLY WORE PPE DURING ENTIRE FACE TO FACE ENCOUNTER IN CLINIC WITH THE PATIENT. PATIENT ALSO WORE PPE DURING ENTIRE FACE TO FACE ENCOUNTER EXCEPT FOR A MAX OF 30 SECONDS DURING NEUROLOGICAL EVALUATION OF CRANIAL NERVES AND THEN MASK WAS PLACED BACK OVER PATIENT FACE FOR REMAINDER OF VISIT. I WASHED MY HANDS BEFORE AND AFTER VISIT.        January Nunes, APRN  12/1/2020

## 2021-07-26 ENCOUNTER — LAB (OUTPATIENT)
Dept: LAB | Facility: HOSPITAL | Age: 71
End: 2021-07-26

## 2021-07-26 ENCOUNTER — OFFICE VISIT (OUTPATIENT)
Dept: NEUROLOGY | Facility: CLINIC | Age: 71
End: 2021-07-26

## 2021-07-26 VITALS
TEMPERATURE: 97.4 F | OXYGEN SATURATION: 97 % | HEIGHT: 63 IN | BODY MASS INDEX: 42.7 KG/M2 | DIASTOLIC BLOOD PRESSURE: 80 MMHG | SYSTOLIC BLOOD PRESSURE: 120 MMHG | HEART RATE: 73 BPM | WEIGHT: 241 LBS

## 2021-07-26 DIAGNOSIS — R07.89 FEELING OF CHEST TIGHTNESS: ICD-10-CM

## 2021-07-26 DIAGNOSIS — E78.5 HYPERLIPIDEMIA, UNSPECIFIED HYPERLIPIDEMIA TYPE: ICD-10-CM

## 2021-07-26 DIAGNOSIS — Z86.73 HISTORY OF CVA (CEREBROVASCULAR ACCIDENT): ICD-10-CM

## 2021-07-26 DIAGNOSIS — R07.89 FEELING OF CHEST TIGHTNESS: Primary | ICD-10-CM

## 2021-07-26 LAB
ALBUMIN SERPL-MCNC: 4.3 G/DL (ref 3.5–5.2)
ALBUMIN/GLOB SERPL: 1.3 G/DL
ALP SERPL-CCNC: 126 U/L (ref 39–117)
ALT SERPL W P-5'-P-CCNC: 16 U/L (ref 1–33)
ANION GAP SERPL CALCULATED.3IONS-SCNC: 13.3 MMOL/L (ref 5–15)
AST SERPL-CCNC: 20 U/L (ref 1–32)
BILIRUB SERPL-MCNC: 0.3 MG/DL (ref 0–1.2)
BUN SERPL-MCNC: 15 MG/DL (ref 8–23)
BUN/CREAT SERPL: 15.3 (ref 7–25)
CALCIUM SPEC-SCNC: 10.1 MG/DL (ref 8.6–10.5)
CHLORIDE SERPL-SCNC: 104 MMOL/L (ref 98–107)
CHOLEST SERPL-MCNC: 140 MG/DL (ref 0–200)
CO2 SERPL-SCNC: 23.7 MMOL/L (ref 22–29)
CREAT SERPL-MCNC: 0.98 MG/DL (ref 0.57–1)
GFR SERPL CREATININE-BSD FRML MDRD: 56 ML/MIN/1.73
GLOBULIN UR ELPH-MCNC: 3.4 GM/DL
GLUCOSE SERPL-MCNC: 89 MG/DL (ref 65–99)
HDLC SERPL-MCNC: 51 MG/DL (ref 40–60)
LDLC SERPL CALC-MCNC: 74 MG/DL (ref 0–100)
LDLC/HDLC SERPL: 1.45 {RATIO}
POTASSIUM SERPL-SCNC: 5 MMOL/L (ref 3.5–5.2)
PROT SERPL-MCNC: 7.7 G/DL (ref 6–8.5)
QT INTERVAL: 426 MS
QTC INTERVAL: 432 MS
SODIUM SERPL-SCNC: 141 MMOL/L (ref 136–145)
TRIGL SERPL-MCNC: 76 MG/DL (ref 0–150)
VLDLC SERPL-MCNC: 15 MG/DL (ref 5–40)

## 2021-07-26 PROCEDURE — 99214 OFFICE O/P EST MOD 30 MIN: CPT | Performed by: NURSE PRACTITIONER

## 2021-07-26 PROCEDURE — 93005 ELECTROCARDIOGRAM TRACING: CPT

## 2021-07-26 PROCEDURE — 80053 COMPREHEN METABOLIC PANEL: CPT

## 2021-07-26 PROCEDURE — 93010 ELECTROCARDIOGRAM REPORT: CPT | Performed by: INTERNAL MEDICINE

## 2021-07-26 PROCEDURE — 36415 COLL VENOUS BLD VENIPUNCTURE: CPT

## 2021-07-26 PROCEDURE — 80061 LIPID PANEL: CPT

## 2021-07-26 NOTE — PROGRESS NOTES
Subjective:     Patient ID: Ritu Gtz is a 70 y.o. female.    CC:   Chief Complaint   Patient presents with   • Numbness   • Carpal Tunnel       HPI:   History of Present Illness     Ms. Gtz is here today for follow-up.  I first saw her in May for hospital follow-up after having a stroke     Patient is deaf, signing  is here with her.      Ms. Gtz was admitted to [facility] on January 15, 2020 after strokelike symptoms.   and daughter who are also deaf noted that she was having trouble signing with her daughter, she was not signing appropriately.  She was also complaining of numbness and tingling on the right side of her body, she had weakness on the right arm and leg.  She presented to the emergency room at that time, CT of the head showed no acute findings.  She was admitted for observation, MRI of the brain showed an acute infarct in the medial left parietal lobe.  Further work-up included CTA of the head which was unremarkable, CTA of the neck showed no significant carotid stenosis.  During hospitalization total cholesterol level was found to be 101, LDL was 137.  She was sent home on atorvastatin 20 mg as well as aspirin.  She took Plavix for approximately 90 days.,  Echocardiogram was also performed with negative bubble study.  When I saw her last she reported she had been doing outpatient physical therapy as well as communicative therapy.  Today she tells me that she is doing better, she has not resumed swimming as pools or not yet open.  She has occasional dizziness when she looks up, she is also complaining of headache on the right side of her head that occurs a couple times a week, resolves with Tylenol.  She is had no recurrent stroke symptoms or weakness     Since discharge she has had no new stroke symptoms, she was  having no problems with balance and she is had no falls.  Prior to this incident she was not taking either aspirin or atorvastatin.     She  "remains on aspirin and atorvastatin today. Last lipid check 9/2020.  To round out stroke work-up I had sent her to a cardiologist in [facility] for a Holter, which was read as normal        At our second visit she had many questions for me,, she had her son on Skype.  She wanted me to tell her if her family was at risk for stroke due to her stroke.  She wanted me to explain to her what chronic small vessel changes were on MRI which I have done.  She was wondering if there was something that could be done to reverse scarring which I told her at this point we just decrease risk factors by using the aspirin and the atorvastatin which she remains on today.  She was also stating that she has chronic numbness and tingling in her feet, ongoing for years from back issues.  She sees Dr. Alvarez at .  She wanted me to talk to her about diet, she feels like she eats good but is unable to lose weight.  She was originally complaining of some  headaches, I started her on amitriptyline 10 mg at night.  She reported that this did help her headache, she slept well and it made her feel less anxious however she felt \"loopy\".  So she stopped the medication after 2 weeks and tells me today she has occasional headaches at this point.   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  She has wrist splints, her CTS symptoms are improved.  She occasionally has some tingling left leg,   She is on gabapentin through Dr. Alvarez, she is \"trying to avoid surgery on her back\".  She is getting injections in her back.    She is had no new stroke symptoms but tells me a few weeks ago she had some \"tightness\" in her chest; no associated diaphoresis or dyspnea.       The following portions of the patient's history were reviewed and updated as appropriate: allergies, current " medications, past family history, past medical history, past social history, past surgical history and problem list.    Past Medical History:   Diagnosis Date   • Anemia    • Arthritis    • Asthma    • Chronic bronchitis (CMS/HCC)    • Deaf    • History of complications due to general anesthesia    • Hypertension    • Osteoporosis    • PONV (postoperative nausea and vomiting)    • Primary osteoarthritis of both knees    • Rheumatoid arthritis (CMS/HCC)    • SOB (shortness of breath)    • Stroke (CMS/HCC)    • Wears dentures    • Wears glasses        Past Surgical History:   Procedure Laterality Date   • APPENDECTOMY     • COLONOSCOPY     • FRACTURE SURGERY      triggered thumb   • HYSTERECTOMY     • PARTIAL KNEE ARTHROPLASTY Right    • WI TOTAL KNEE ARTHROPLASTY Right 2/8/2017    Procedure: RIGHT TOTAL KNEE ARTHROPLASTY;  Surgeon: Milton Romero MD;  Location: [facility];  Service: Orthopedics   • SHOULDER SURGERY      scope with ligament repair    • TONSILLECTOMY         Social History     Socioeconomic History   • Marital status:      Spouse name: Not on file   • Number of children: Not on file   • Years of education: Not on file   • Highest education level: Not on file   Tobacco Use   • Smoking status: Never Smoker   • Smokeless tobacco: Never Used   Vaping Use   • Vaping Use: Never used   Substance and Sexual Activity   • Alcohol use: No   • Drug use: No   • Sexual activity: Not Currently       Family History   Problem Relation Age of Onset   • Cancer Mother    • Depression Mother    • Arthritis Mother    • Bone cancer Mother    • Dementia Mother    • Cancer Father    • Diabetes Father    • Arthritis Father    • Alcohol abuse Father    • Diabetes insipidus Father    • Heart disease Father    • Hypertension Father    • Hyperlipidemia Father    • Stroke Father    • Arthritis Sister         Review of Systems   Constitutional: Negative.    HENT: Negative.    Eyes: Negative.    Respiratory: Negative.   "  Cardiovascular:        Occasional chest \"tightness\"   Gastrointestinal: Negative.    Endocrine: Negative.    Genitourinary: Negative.    Musculoskeletal: Positive for arthralgias (knee issues) and back pain (chronic, seeing neurosurgery at ). Negative for gait problem, myalgias and neck pain.   Skin: Negative.    Allergic/Immunologic: Negative.    Neurological: Positive for numbness. Negative for dizziness, tremors, seizures, syncope, facial asymmetry, weakness, light-headedness and headaches.   Hematological: Negative.    Psychiatric/Behavioral: Negative.         Objective:  /80   Pulse 73   Temp 97.4 °F (36.3 °C)   Ht 160 cm (63\")   Wt 109 kg (241 lb)   SpO2 97%   BMI 42.69 kg/m²     Neurologic Exam     Mental Status   Oriented to person, place, and time.   Attention: normal. Concentration: normal.   Level of consciousness: alert  Knowledge: consistent with education.   Able to read. Able to write. Normal comprehension.   Pt deaf, does not speak     Cranial Nerves   Cranial nerves II through XII intact.     CN II   Right visual field deficit: none    CN III, IV, VI   Pupils are equal, round, and reactive to light.  Extraocular motions are normal.   Right pupil: Size: 3 mm. Shape: regular. Reactivity: brisk. Consensual response: intact. Accommodation: intact.   Left pupil: Size: 3 mm. Shape: regular. Reactivity: brisk. Consensual response: intact. Accommodation: intact.   CN III: no CN III palsy  CN VI: no CN VI palsy  Nystagmus: none   Upgaze: normal  Downgaze: normal    CN V   Facial sensation intact.     CN VII   Facial expression full, symmetric.     CN VIII   CN VIII normal.     CN IX, X   CN IX normal.   CN X normal.     CN XI   CN XI normal.     CN XII   CN XII normal.     Motor Exam   Muscle bulk: normal  Overall muscle tone: normal  Right arm tone: normal  Left arm tone: normal  Right arm pronator drift: absent  Left arm pronator drift: absent  Right leg tone: normal  Left leg tone: " normal    Strength   Strength 5/5 throughout.     Sensory Exam   Light touch normal.     Gait, Coordination, and Reflexes     Gait  Gait: normal    Coordination   Romberg: negative  Finger to nose coordination: normal  Heel to shin coordination: normal  Tandem walking coordination: normal    Tremor   Resting tremor: absent  Intention tremor: absent  Action tremor: absent    Reflexes   Reflexes 2+ except as noted.       Physical Exam  Vitals reviewed.   Constitutional:       General: She is not in acute distress.     Appearance: Normal appearance. She is well-developed. She is obese. She is not ill-appearing or toxic-appearing.   HENT:      Head: Normocephalic and atraumatic.      Mouth/Throat:      Mouth: Mucous membranes are moist.   Eyes:      General: No scleral icterus.     Extraocular Movements: Extraocular movements intact and EOM normal.      Conjunctiva/sclera: Conjunctivae normal.      Pupils: Pupils are equal, round, and reactive to light.   Pulmonary:      Effort: Pulmonary effort is normal. No respiratory distress.   Musculoskeletal:      Cervical back: Normal range of motion and neck supple.   Skin:     General: Skin is warm.      Capillary Refill: Capillary refill takes less than 2 seconds.   Neurological:      General: No focal deficit present.      Mental Status: She is alert and oriented to person, place, and time.      Coordination: Finger-Nose-Finger Test, Heel to Shin Test and Romberg Test normal.      Gait: Gait is intact. Tandem walk normal.      Deep Tendon Reflexes: Strength normal.   Psychiatric:         Mood and Affect: Mood normal.         Behavior: Behavior normal.         Thought Content: Thought content normal.         Judgment: Judgment normal.         Assessment/Plan:       Diagnoses and all orders for this visit:    1. Feeling of chest tightness (Primary)  -     ECG 12 Lead; Future    2. History of CVA (cerebrovascular accident)  -     Lipid Panel; Future  -     Comprehensive  Metabolic Panel; Future    3. Hyperlipidemia, unspecified hyperlipidemia type  -     Lipid Panel; Future  -     Comprehensive Metabolic Panel; Future    no stroke symptoms, continue ASA and statin, lipid panel pending. Pt reports PCP refilling atorvastatin  Check EKG on her way out, she is encouraged to call and make appt with her cardiologist in [place], to ER with acute chest pain/tightness, pt signs understanding  CTS symptoms improved, continue splints.  She has been very active swimming and walking with no problems.  To ER with any chest pain or stroke symptoms, RTC 6 mo or sooner prn  Discussed signs and symptoms of stroke and when to call 911. Instructed to follow a low fat diet including the Mediterranean diet. Instructed to take all medications daily as prescribed. Encouraged 30 minutes of exercise 3-4 times a week as tolerated. Stay well hydrated. Discussed potential side effects of new medications and signs and symptoms to report. If patient is currently using tobacco products, smoking cessation was encouraged. Reviewed stroke risk factors and stroke prevention plan. Patient and/or family verbalizes understanding and agrees with plan.   Total time visit 30 min face to face,  (sign language)  needed for exam         Reviewed medications, potential side effects and signs and symptoms to report. Discussed risk versus benefits of treatment plan with patient and/or family-including medications, labs and radiology that may be ordered. Addressed questions and concerns during visit. Patient and/or family verbalized understanding and agree with plan.    AS THE PROVIDER, I PERSONALLY WORE PPE DURING ENTIRE FACE TO FACE ENCOUNTER IN CLINIC WITH THE PATIENT. PATIENT ALSO WORE PPE DURING ENTIRE FACE TO FACE ENCOUNTER EXCEPT FOR A MAX OF 30 SECONDS DURING NEUROLOGICAL EVALUATION OF CRANIAL NERVES AND THEN MASK WAS PLACED BACK OVER PATIENT FACE FOR REMAINDER OF VISIT. I WASHED MY HANDS BEFORE AND AFTER  VISIT.      January Nunes, APRN  7/26/2021

## 2021-07-26 NOTE — PROGRESS NOTES
Please let patient know that her EKG was read as abnormal but stable from previous.  I do recommend that this EKG be faxed to her current cardiologist and able when she get in with him ASAP as she has been having chest tightness  To ER with any acute chest pain or tightness

## 2021-07-27 NOTE — PROGRESS NOTES
Please let patient know her cholesterol levels are stable  Fax a copy of labs to her primary care please

## 2021-07-30 ENCOUNTER — TELEPHONE (OUTPATIENT)
Dept: NEUROLOGY | Facility: CLINIC | Age: 71
End: 2021-07-30

## 2021-07-30 NOTE — TELEPHONE ENCOUNTER
PATIENT INFORMED OF RESULTS AND EKG. RESULTS SENT TO PCP AND COPY FAXED TO DR INNA STALEY CARDIOLOGIST. [phone]. SHE DID CALL THEM AND HAS A FOLLOW UP NEXT WEEK.

## 2021-07-30 NOTE — TELEPHONE ENCOUNTER
----- Message from MER Sheth sent at 7/26/2021  1:31 PM EDT -----  Please let patient know that her EKG was read as abnormal but stable from previous.  I do recommend that this EKG be faxed to her current cardiologist and able when she get in with him ASAP as she has been having chest tightness  To ER with any acute chest pain or tightness

## 2021-07-30 NOTE — TELEPHONE ENCOUNTER
----- Message from MER Sheth sent at 7/27/2021  7:40 AM EDT -----  Please let patient know her cholesterol levels are stable  Fax a copy of labs to her primary care please

## 2022-03-16 ENCOUNTER — OFFICE VISIT (OUTPATIENT)
Dept: NEUROLOGY | Facility: CLINIC | Age: 72
End: 2022-03-16

## 2022-03-16 VITALS
BODY MASS INDEX: 38.8 KG/M2 | DIASTOLIC BLOOD PRESSURE: 82 MMHG | HEIGHT: 63 IN | HEART RATE: 80 BPM | WEIGHT: 219 LBS | TEMPERATURE: 97.7 F | OXYGEN SATURATION: 98 % | SYSTOLIC BLOOD PRESSURE: 150 MMHG

## 2022-03-16 DIAGNOSIS — Z86.73 HISTORY OF STROKE: Primary | ICD-10-CM

## 2022-03-16 DIAGNOSIS — R41.3 MEMORY LOSS: ICD-10-CM

## 2022-03-16 PROCEDURE — 99215 OFFICE O/P EST HI 40 MIN: CPT | Performed by: NURSE PRACTITIONER

## 2022-03-16 RX ORDER — AMLODIPINE BESYLATE 5 MG/1
5 TABLET ORAL DAILY
COMMUNITY

## 2022-03-16 NOTE — PROGRESS NOTES
Subjective:     Patient ID: Ritu Gtz is a 71 y.o. female.    CC:   Chief Complaint   Patient presents with   • Follow-up       HPI:   History of Present Illness     Ms. Gtz is here today for follow-up.  I first saw her in May for hospital follow-up after having a stroke     Patient is deaf, signing  is here with her.      Ms. Gtz was admitted to [facility] on January 15, 2020 after strokelike symptoms.   and daughter who are also deaf noted that she was having trouble signing with her daughter, she was not signing appropriately.  She was also complaining of numbness and tingling on the right side of her body, she had weakness on the right arm and leg.  She presented to the emergency room at that time, CT of the head showed no acute findings.  She was admitted for observation, MRI of the brain showed an acute infarct in the medial left parietal lobe.  Further work-up included CTA of the head which was unremarkable, CTA of the neck showed no significant carotid stenosis.  During hospitalization total cholesterol level was found to be 101, LDL was 137.  She was sent home on atorvastatin 20 mg as well as aspirin.  She took Plavix for approximately 90 days.,  Echocardiogram was also performed with negative bubble study.  She completed  outpatient physical therapy.     Since discharge she has had no new stroke symptoms, she was  having no problems with balance and she is had no falls.  Prior to this incident she was not taking either aspirin or atorvastatin.     She remains on aspirin and atorvastatin today.  To round out stroke work-up I had sent her to a cardiologist in [facility] for a Holter, which was read as normal          At our second visit she had many questions for me,, she had her son on Skype.  She wanted me to tell her if her family was at risk for stroke due to her stroke.  She wanted me to explain to her what chronic small vessel changes were on MRI  "which I have done.  She was wondering if there was something that could be done to reverse scarring which I told her at this point we just decrease risk factors by using the aspirin and the atorvastatin which she remains on today.  She was also stating that she has chronic numbness and tingling in her feet, ongoing for years from back issues.  She sees Dr. Alvarez at .  She wanted me to talk to her about diet, she feels like she eats good but is unable to lose weight.  She was originally complaining of some  headaches, I started her on amitriptyline 10 mg at night.  She reported that this did help her headache, she slept well and it made her feel less anxious however she felt \"loopy\".  So she stopped the medication after 2 weeks.  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  She has wrist splints, her CTS symptoms are improved.  She occasionally has some tingling left leg,   She is on gabapentin through Dr. Alvarez,  she actually had a lumbar decompression just a few days ago and is already noticing improvement in the pain and tingling in her legs.  She sees neurosurgery at     Today she tells me she has had no recurrent stroke symptoms.  She seems to be noticing that she is a bit forgetful at times, she will forget what she is going into her room to get.  Nothing significant as far as accidents and no true confusion.  Previously B12 level was normal as well as folic acid     The following portions of the patient's history were reviewed and updated as appropriate: allergies, current medications, past family history, past medical history, past social history, past surgical history and problem list.    Past Medical History:   Diagnosis Date   • Anemia    • Arthritis    • Asthma    • Chronic bronchitis (HCC)    • Deaf    • History of complications due to general " "anesthesia    • Hypertension    • Osteoporosis    • PONV (postoperative nausea and vomiting)    • Primary osteoarthritis of both knees    • Rheumatoid arthritis (HCC)    • SOB (shortness of breath)    • Stroke (HCC)    • Wears dentures    • Wears glasses        Past Surgical History:   Procedure Laterality Date   • APPENDECTOMY     • COLONOSCOPY     • FRACTURE SURGERY      triggered thumb   • HYSTERECTOMY     • PARTIAL KNEE ARTHROPLASTY Right    • OR TOTAL KNEE ARTHROPLASTY Right 2/8/2017    Procedure: RIGHT TOTAL KNEE ARTHROPLASTY;  Surgeon: Milton Romero MD;  [facility];  Service: Orthopedics   • SHOULDER SURGERY      scope with ligament repair    • TONSILLECTOMY         Social History     Socioeconomic History   • Marital status:    Tobacco Use   • Smoking status: Never Smoker   • Smokeless tobacco: Never Used   Vaping Use   • Vaping Use: Never used   Substance and Sexual Activity   • Alcohol use: No   • Drug use: No   • Sexual activity: Not Currently       Family History   Problem Relation Age of Onset   • Cancer Mother    • Depression Mother    • Arthritis Mother    • Bone cancer Mother    • Dementia Mother    • Cancer Father    • Diabetes Father    • Arthritis Father    • Alcohol abuse Father    • Diabetes insipidus Father    • Heart disease Father    • Hypertension Father    • Hyperlipidemia Father    • Stroke Father    • Arthritis Sister         Review of Systems   Constitutional: Negative.    HENT: Negative.    Respiratory: Negative.    Cardiovascular: Negative.    Gastrointestinal: Negative.    Neurological: Negative for dizziness, tremors, seizures, syncope, facial asymmetry, weakness, light-headedness, numbness and headaches. Speech difficulty: pt is deaf.        Objective:  /82 (BP Location: Left arm, Patient Position: Sitting, Cuff Size: Adult)   Pulse 80   Temp 97.7 °F (36.5 °C) (Temporal)   Ht 160 cm (63\")   Wt 99.3 kg (219 lb)   SpO2 98%   Breastfeeding No   " BMI 38.79 kg/m²     Neurologic Exam     Mental Status   Oriented to person, place, and time.   Level of consciousness: alert  Patient is alert and oriented  MMSE done with the help of   Normal score     Cranial Nerves   Cranial nerves II through XII intact.     CN III, IV, VI   Pupils are equal, round, and reactive to light.    Motor Exam   Muscle bulk: normal    Strength   Strength 5/5 throughout.     Gait, Coordination, and Reflexes     Gait  Gait: normal    Coordination   Finger to nose coordination: normal    Tremor   Resting tremor: absent  Intention tremor: absent  Action tremor: absent1+ DTRs       Physical Exam  Vitals reviewed.   Constitutional:       General: She is not in acute distress.     Appearance: She is well-developed. She is obese. She is not ill-appearing or toxic-appearing.   HENT:      Head: Normocephalic and atraumatic.      Mouth/Throat:      Mouth: Mucous membranes are moist.   Eyes:      General: No scleral icterus.     Extraocular Movements: Extraocular movements intact.      Conjunctiva/sclera: Conjunctivae normal.      Pupils: Pupils are equal, round, and reactive to light.   Pulmonary:      Effort: Pulmonary effort is normal. No respiratory distress.   Musculoskeletal:      Cervical back: Normal range of motion and neck supple.   Skin:     General: Skin is warm.      Capillary Refill: Capillary refill takes less than 2 seconds.   Neurological:      Mental Status: She is alert and oriented to person, place, and time.      Coordination: Finger-Nose-Finger Test normal.      Gait: Gait is intact.      Deep Tendon Reflexes: Strength normal.   Psychiatric:         Mood and Affect: Mood normal.         Behavior: Behavior normal.         Thought Content: Thought content normal.         Judgment: Judgment normal.         Assessment/Plan:       Diagnoses and all orders for this visit:    1. History of stroke (Primary)  Comments:  Continue aspirin and atorvastatin  No  recurrent stroke symptoms    2. Memory loss  Comments:  MMSE reassuring, normal score.  We will continue to monitor    Ms. Gtz has had no further stroke symptoms, headaches improved.  She is noticing some very mild forgetfulness.  She has had 2 MRIs of the brain which have shown chronic changes.  MMSE score reassuring today.  She is asking about Privigen over-the-counter, there is no contraindication for this if she would like to try it.  We did discuss that gabapentin can at times affect memory, she had talked to her spinal surgeon about coming off of this but he wants her to stay on just a little bit longer as she just had surgery less than a week ago.  She is planning to try to titrate off, will reevaluate her memory again in a few months after she has come off the gabapentin  We will see her back in about 4 to 6 months but I encouraged her to reach out with any problems or concerns prior to follow-up appointment  Discussed signs and symptoms of stroke and when to call 911. Instructed to follow a low fat diet including the Mediterranean diet. Instructed to take all medications daily as prescribed. Encouraged 30 minutes of exercise 3-4 times a week as tolerated. Stay well hydrated. Discussed potential side effects of new medications and signs and symptoms to report. If patient is currently using tobacco products, smoking cessation was encouraged. Reviewed stroke risk factors and stroke prevention plan. Patient and/or family verbalizes understanding and agrees with plan.   Total time of visit face-to-face 45 minutes with greater than 30 minutes spent         Reviewed medications, potential side effects and signs and symptoms to report. Discussed risk versus benefits of treatment plan with patient and/or family-including medications, labs and radiology that may be ordered. Addressed questions and concerns during visit. Patient and/or family verbalized understanding and agree with plan.    AS THE PROVIDER, I  PERSONALLY WORE PPE DURING ENTIRE FACE TO FACE ENCOUNTER IN CLINIC WITH THE PATIENT. PATIENT ALSO WORE PPE DURING ENTIRE FACE TO FACE ENCOUNTER EXCEPT FOR A MAX OF 30 SECONDS DURING NEUROLOGICAL EVALUATION OF CRANIAL NERVES AND THEN MASK WAS PLACED BACK OVER PATIENT FACE FOR REMAINDER OF VISIT. I WASHED MY HANDS BEFORE AND AFTER VISIT.    During this visit the following were done:  Labs Reviewed []    Labs Ordered []    Radiology Reports Reviewed []    Radiology Ordered []    PCP Records Reviewed []    Referring Provider Records Reviewed []    ER Records Reviewed []    Hospital Records Reviewed []    History Obtained From Family []    Radiology Images Reviewed []    Other Reviewed []    Records Requested []      January Nunes, APRN  3/16/2022

## 2022-09-26 ENCOUNTER — OFFICE VISIT (OUTPATIENT)
Dept: NEUROLOGY | Facility: CLINIC | Age: 72
End: 2022-09-26

## 2022-09-26 VITALS
BODY MASS INDEX: 37.28 KG/M2 | WEIGHT: 210.4 LBS | DIASTOLIC BLOOD PRESSURE: 72 MMHG | TEMPERATURE: 97.3 F | OXYGEN SATURATION: 98 % | HEART RATE: 75 BPM | HEIGHT: 63 IN | SYSTOLIC BLOOD PRESSURE: 136 MMHG

## 2022-09-26 DIAGNOSIS — Z86.73 HISTORY OF STROKE: Primary | ICD-10-CM

## 2022-09-26 DIAGNOSIS — R41.3 MEMORY LOSS: ICD-10-CM

## 2022-09-26 DIAGNOSIS — E78.5 HYPERLIPIDEMIA, UNSPECIFIED HYPERLIPIDEMIA TYPE: ICD-10-CM

## 2022-09-26 DIAGNOSIS — Z91.81 HISTORY OF FALL WITHIN PAST 90 DAYS: ICD-10-CM

## 2022-09-26 PROCEDURE — 99213 OFFICE O/P EST LOW 20 MIN: CPT | Performed by: NURSE PRACTITIONER

## 2022-09-26 RX ORDER — PANTOPRAZOLE SODIUM 40 MG/1
40 TABLET, DELAYED RELEASE ORAL DAILY
COMMUNITY
Start: 2022-08-09

## 2022-09-26 RX ORDER — FLUTICASONE PROPIONATE 50 MCG
2 SPRAY, SUSPENSION (ML) NASAL DAILY
COMMUNITY
Start: 2022-09-19

## 2022-09-26 RX ORDER — MECLIZINE HYDROCHLORIDE 25 MG/1
TABLET ORAL
COMMUNITY
Start: 2022-06-24 | End: 2022-11-03

## 2022-09-26 NOTE — PROGRESS NOTES
Neuro Office Visit      Encounter Date: 2022   Patient Name: Ritu Gtz  : 1950   MRN: [id number]   PCP:  Dustin Plaza MD     Chief Complaint:    Chief Complaint   Patient presents with   • Stroke       History of Present Illness: Ritu Gtz is a 72 y.o. female who is here today in Neurology for history of stroke and memory loss.     Last seen by MER Patel on 3/16/22, continued on ASA/atorvastatin.     Pt is deaf and has  in the room    History of Stroke    Continues on ASA/Atorvastatin    No residual symptoms or new stroke symptoms     Has lost some weight and would like to lose more. Is planning to go back to water aerobics.       PH:  Admitted to [facility] on 1/15/20 for stroke like symptoms, difficulty signing (pt is deaf)weakness RUE/RLE and numbness on the right side of her body. MRI acute infarct left medial parietal lobe. CTA neck no significant stenosis. Echo with negative bubble study. Holter monitor no A-Fib. Discharged on ASA/atorvastatin and Plavix for 90 days.     Peripheral Neuropathy    Lumbar decompression at  per Dr. Alvarez, 3/22.     EMG moderate axonal peripheral neuropathy, mild/moderate left carpal tunnel    Has completely resolved since lumbar decompression    Now off gabapentin    Memory Loss    Forgetful at times but does not interfere with daily life    Recent Fall  Had a recent fall and broke her nose.     She denies dizziness or loss of balance.     No LOC.     Was seen in ED, CT scan of the head reported normal by pt          Subjective      negative    Past Medical History:   Past Medical History:   Diagnosis Date   • Anemia    • Arthritis    • Asthma    • Chronic bronchitis (HCC)    • Deaf    • History of complications due to general anesthesia    • Hyperlipidemia    • Hypertension    • Osteoporosis    • PONV (postoperative nausea and vomiting)    • Primary osteoarthritis of both knees    • Rheumatoid arthritis (HCC)    • SOB  (shortness of breath)    • Stroke (HCC)    • Wears dentures    • Wears glasses        Past Surgical History:   Past Surgical History:   Procedure Laterality Date   • APPENDECTOMY     • COLONOSCOPY     • FRACTURE SURGERY      triggered thumb   • HYSTERECTOMY     • PARTIAL KNEE ARTHROPLASTY Right    • MS TOTAL KNEE ARTHROPLASTY Right 2017    Procedure: RIGHT TOTAL KNEE ARTHROPLASTY;  Surgeon: Milton Romero MD;  Location: [facility];  Service: Orthopedics   • SHOULDER SURGERY      scope with ligament repair    • TONSILLECTOMY         Family History:   Family History   Problem Relation Age of Onset   • Cancer Mother    • Depression Mother    • Arthritis Mother    • Bone cancer Mother    • Dementia Mother    • Cancer Father    • Diabetes Father    • Arthritis Father    • Alcohol abuse Father    • Diabetes insipidus Father    • Heart disease Father    • Hypertension Father    • Hyperlipidemia Father    • Stroke Father            • Arthritis Sister        Social History:   Social History     Socioeconomic History   • Marital status:    Tobacco Use   • Smoking status: Never Smoker   • Smokeless tobacco: Never Used   Vaping Use   • Vaping Use: Never used   Substance and Sexual Activity   • Alcohol use: No   • Drug use: No   • Sexual activity: Not Currently     Partners: Male     Birth control/protection: None       Medications:     Current Outpatient Medications:   •  acetaminophen (TYLENOL) 500 MG tablet, Take  by mouth Take As Directed., Disp: , Rfl:   •  amLODIPine (NORVASC) 5 MG tablet, Take 5 mg by mouth Daily., Disp: , Rfl:   •  aspirin 81 MG EC tablet, Take 81 mg by mouth 2 (Two) Times a Day., Disp: , Rfl:   •  atorvastatin (LIPITOR) 40 MG tablet, Take 40 mg by mouth every night at bedtime., Disp: , Rfl:   •  Cyanocobalamin (VITAMIN B-12 CR PO), Take 1 tablet by mouth Daily., Disp: , Rfl:   •  Mirabegron ER (MYRBETRIQ) 50 MG tablet sustained-release 24 hour 24 hr tablet, Take 50 mg by  mouth Daily., Disp: , Rfl:   •  vitamin C (ASCORBIC ACID) 500 MG tablet, Take 500 mg by mouth Daily., Disp: , Rfl:   •  vitamin D (ERGOCALCIFEROL) 1.25 MG (49606 UT) capsule capsule, Take 50,000 Units by mouth 1 (One) Time Per Week., Disp: , Rfl:   •  fluticasone (FLONASE) 50 MCG/ACT nasal spray, 2 sprays by Each Nare route Daily., Disp: , Rfl:   •  meclizine (ANTIVERT) 25 MG tablet, PRN, Disp: , Rfl:   •  pantoprazole (PROTONIX) 40 MG EC tablet, Take 40 mg by mouth Daily., Disp: , Rfl:     Allergies:   Allergies   Allergen Reactions   • Darvon [Propoxyphene] Other (See Comments)     Passed out   • Onion        PHQ-9 Total Score:     DELICIA Fall Risk Assessment was completed, and patient is at HIGH risk for falls. Assessment completed on:9/26/2022    Objective     Physical Exam:   Physical Exam  Eyes:      Extraocular Movements: EOM normal.      Pupils: Pupils are equal, round, and reactive to light.   Neurological:      Mental Status: She is oriented to person, place, and time.      Gait: Gait is intact.   Psychiatric:         Speech: Speech normal.         Neurologic Exam     Mental Status   Oriented to person, place, and time.   Attention: normal. Concentration: normal.   Speech: speech is normal   Level of consciousness: alert    Cranial Nerves     CN II   Visual fields full to confrontation.     CN III, IV, VI   Pupils are equal, round, and reactive to light.  Extraocular motions are normal.   Right pupil: Size: 4 mm. Shape: regular. Reactivity: brisk.   Left pupil: Size: 4 mm. Shape: regular. Reactivity: brisk.   CN III: no CN III palsy  CN VI: no CN VI palsy  Nystagmus: none     CN V   Facial sensation intact.     CN VII   Facial expression full, symmetric.     CN VIII   CN VIII normal.     CN IX, X   CN IX normal.   CN X normal.     CN XI   CN XI normal.     CN XII   CN XII normal.     Motor Exam     Strength   Right neck flexion: 5/5  Left neck flexion: 5/5  Right neck extension: 5/5  Left neck extension:  "5/5  Right deltoid: 5/5  Left deltoid: 5/5  Right biceps: 5/5  Left biceps: 5/5  Right triceps: 5/5  Left triceps: 5/5  Right wrist flexion: 5/5  Left wrist flexion: 5/5  Right wrist extension: 5/5  Left wrist extension: 5/5  Right interossei: 5/5  Left interossei: 5/5  Right abdominals: 5/5  Left abdominals: 5/5  Right iliopsoas: 5/5  Left iliopsoas: 5/5  Right quadriceps: 5/5  Left quadriceps: 5/5  Right hamstrin/5  Left hamstrin/5  Right glutei: 5/5  Left glutei: 5/5  Right anterior tibial: 5/5  Left anterior tibial: 5/5  Right posterior tibial: 5/5  Left posterior tibial: 5/5  Right peroneal: 5/5  Left peroneal: 5/5  Right gastroc: 5/5  Left gastroc: 5/5    Sensory Exam   Light touch normal.     Gait, Coordination, and Reflexes     Gait  Gait: normal       Vital Signs:   Vitals:    22 1107   BP: 136/72   Pulse: 75   Temp: 97.3 °F (36.3 °C)   SpO2: 98%   Weight: 95.4 kg (210 lb 6.4 oz)   Height: 160 cm (62.99\")     Body mass index is 37.28 kg/m².     Results:   Imaging:   No Images in the past 120 days found..     Labs:    No results found for: CMP, PROTEIN, ANTIMOGAB, LJMWCN9QJRY, JCVRESULT, QUANTTBGOLD, CBCDIF, IGGALBSER     Assessment / Plan      Assessment/Plan:   Diagnoses and all orders for this visit:    1. History of stroke (Primary)  Comments:  Continue ASA/atorvasatin  BP management  Exercise    2. Memory loss  Comments:  Mild forgetfullness, not bothersome    3. Hyperlipidemia, unspecified hyperlipidemia type  Comments:  On Atorvastatin    4. History of fall within past 90 days  Comments:  No LOC         Patient Education:     Reviewed medications, potential side effects and signs and symptoms to report. Discussed risk versus benefits of treatment plan with patient and/or family-including medications, labs and radiology that may be ordered. Continue with lifestyle modifications for weight loss. Addressed questions and concerns during visit. Patient verbalized understanding and agree with " plan. Instructed to call the office with any questions and report to ER with any life-threatening symptoms.     Follow Up:   Return in about 1 year (around 9/26/2023).    I spent 45 minutes face to face with the patient. I personally spent 50 percent of this time counseling and discussing diagnosis, treatment options and management .       During this visit the following were done:  Labs Reviewed [x]    Labs Ordered []    Radiology Reports Reviewed [x]    Radiology Ordered []    PCP Records Reviewed []    Referring Provider Records Reviewed [x]    ER Records Reviewed []    Hospital Records Reviewed []    History Obtained From Family []    Radiology Images Reviewed [x]    Other Reviewed [x]    Records Requested []      MER Tinajero  [facility] NEUROLOGY  [address] [place] [id number] [phone]

## 2022-11-03 ENCOUNTER — OFFICE VISIT (OUTPATIENT)
Dept: ORTHOPEDIC SURGERY | Facility: CLINIC | Age: 72
End: 2022-11-03

## 2022-11-03 VITALS
SYSTOLIC BLOOD PRESSURE: 140 MMHG | BODY MASS INDEX: 35.79 KG/M2 | WEIGHT: 202 LBS | HEIGHT: 63 IN | DIASTOLIC BLOOD PRESSURE: 88 MMHG
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
## 2022-11-03 DIAGNOSIS — S69.91XA INJURY OF RIGHT HAND, INITIAL ENCOUNTER: ICD-10-CM

## 2022-11-03 DIAGNOSIS — S89.92XA INJURY OF LEFT KNEE, INITIAL ENCOUNTER: ICD-10-CM

## 2022-11-03 DIAGNOSIS — M17.12 PRIMARY OSTEOARTHRITIS OF LEFT KNEE: ICD-10-CM

## 2022-11-03 PROCEDURE — 99214 OFFICE O/P EST MOD 30 MIN: CPT | Performed by: ORTHOPAEDIC SURGERY

## 2022-11-03 NOTE — PROGRESS NOTES
[facility] Orthopaedic Surgery Clinic Note        Subjective     Pain of the Left Knee (Fall, DOI: 9/13/22) and Pain of the Right Hand (Ring finger, fall DOI: 9/13/22)      HPI    Ritu Gtz is a 72 y.o. female who presents with new problem of: left knee pain and right ring finger pain.  Onset: mechanical fall. The issue has been ongoing for 7 week(s). Pain is a 5/10 on the pain scale. Pain is described as aching and shooting. Associated symptoms include pain and swelling. The pain is worse with walking, climbing stairs and lying on affected side; resting and pain medication and/or NSAID improve the pain. Previous treatments have included: nothing.    I have reviewed the following portions of the patient's history:History of Present Illness and review of systems.       Patient here today for work-related injury to her right hand and left knee.  This occurred on 9/13/2022 when she was working as a .  She says that she tripped and fell and landed on her nose and left knee.  She was last seen by me in June 2020 for left knee arthritis.  Communicating with an ASL  via the language line today.  Patient was seen at the [facility] ER and diagnosed with nasal fracture and knee contusion.  She has had some follow-up for her nasal fracture with ENT locally.  She has been seeing her PCP apparently.  She is not had any formal treatment for her hand or knee.  She says that once in a while the knee swells and the hand is stiff for her for the most part she is improving overall.  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      Past Medical History:   Diagnosis Date   • Anemia    • Arthritis    • Asthma    • Chronic bronchitis (HCC)    • Deaf    • History of complications due to general anesthesia    • Hyperlipidemia    • Hypertension    • Osteoporosis    • PONV (postoperative nausea and vomiting)    • Primary osteoarthritis of both knees    • Rheumatoid arthritis (HCC)    • SOB  (shortness of breath)    • Stroke (HCC)    • Wears dentures    • Wears glasses       Past Surgical History:   Procedure Laterality Date   • APPENDECTOMY     • COLONOSCOPY     • FRACTURE SURGERY      triggered thumb   • HYSTERECTOMY     • PARTIAL KNEE ARTHROPLASTY Right    • MT TOTAL KNEE ARTHROPLASTY Right 2017    Procedure: RIGHT TOTAL KNEE ARTHROPLASTY;  Surgeon: Milton Romero MD;  Location: [facility];  Service: Orthopedics   • SHOULDER SURGERY      scope with ligament repair    • TONSILLECTOMY        Family History   Problem Relation Age of Onset   • Cancer Mother    • Depression Mother    • Arthritis Mother    • Bone cancer Mother    • Dementia Mother    • Cancer Father    • Diabetes Father    • Arthritis Father    • Alcohol abuse Father    • Diabetes insipidus Father    • Heart disease Father    • Hypertension Father    • Hyperlipidemia Father    • Stroke Father            • Arthritis Sister      Social History     Socioeconomic History   • Marital status:    Tobacco Use   • Smoking status: Never   • Smokeless tobacco: Never   Vaping Use   • Vaping Use: Never used   Substance and Sexual Activity   • Alcohol use: No   • Drug use: No   • Sexual activity: Not Currently     Partners: Male     Birth control/protection: None      Current Outpatient Medications on File Prior to Visit   Medication Sig Dispense Refill   • acetaminophen (TYLENOL) 500 MG tablet Take  by mouth Take As Directed.     • amLODIPine (NORVASC) 5 MG tablet Take 5 mg by mouth Daily.     • aspirin 81 MG EC tablet Take 81 mg by mouth 2 (Two) Times a Day.     • atorvastatin (LIPITOR) 40 MG tablet Take 40 mg by mouth every night at bedtime.     • fluticasone (FLONASE) 50 MCG/ACT nasal spray 2 sprays by Each Nare route Daily.     • Mirabegron ER (MYRBETRIQ) 50 MG tablet sustained-release 24 hour 24 hr tablet Take 50 mg by mouth Daily.     • pantoprazole (PROTONIX) 40 MG EC tablet Take 40 mg by mouth Daily.     • vitamin C  "(ASCORBIC ACID) 500 MG tablet Take 500 mg by mouth Daily.     • vitamin D (ERGOCALCIFEROL) 1.25 MG (68215 UT) capsule capsule Take 50,000 Units by mouth 1 (One) Time Per Week.     • Cyanocobalamin (VITAMIN B-12 CR PO) Take 1 tablet by mouth Daily.     • [DISCONTINUED] meclizine (ANTIVERT) 25 MG tablet PRN       No current facility-administered medications on file prior to visit.      Allergies   Allergen Reactions   • Darvon [Propoxyphene] Other (See Comments)     Passed out   • Onion           Review of Systems   Constitutional: Negative.    HENT: Negative.    Eyes: Negative.    Respiratory: Negative.    Cardiovascular: Negative.    Gastrointestinal: Negative.    Endocrine: Negative.    Genitourinary: Negative.    Musculoskeletal: Positive for arthralgias.   Skin: Negative.    Allergic/Immunologic: Negative.    Neurological: Negative.    Hematological: Negative.    Psychiatric/Behavioral: Negative.         I reviewed the patient's chief complaint, history of present illness, review of systems, past medical history, surgical history, family history, social history, medications and allergy list.        Objective      Physical Exam  /88   Ht 160 cm (62.99\")   Wt 91.6 kg (202 lb)   BMI 35.79 kg/m²     Body mass index is 35.79 kg/m².    General  Mental Status - alert  General Appearance - cooperative, well groomed, not in acute distress  Orientation - Oriented X3  Build & Nutrition - well developed and well nourished  Posture - normal posture  Gait - normal gait       Ortho Exam      Musculoskeletal:  Global Assessment:  Overall assessment of Lower Extremity Muscle Strength and Tone:  Left quadriceps--5/5   Left hamstrings--5/5       Left tibialis anterior--5/5  Left gastroc-soleus--5/5  Left EHL --5/5    Lower Extremity:    Inspection and Palpation:  Left knee:  Tenderness:  Over the medial joint line and moderate severity  Effusion:  1+  Crepitus:  Positive  Pulses:  2+  Ecchymosis:  None  Warmth:  None "     ROM:  Left:  Extension: 5     Flexion:120    Instability:    Left:    Lachman Test:  Negative   Varus stress test negative  Valgus stress test negative    Deformities/Malalignments/Discrepancies:    Left:  Genu Varum     Functional Testing:  Ezequiel's test:  Negative  Patella grind test:  Positive  Q-angle:  Normal      Right hand: Patient has full tip to palm flexion full extension.  She is tender about the proximal phalanx.  No instability noted at the IP joints.  Mildly tender at the region of the A1 pulley but no reproducible locking or catching.      Imaging/Studies  Imaging Results (Last 24 Hours)     Procedure Component Value Units Date/Time    XR Finger 2+ View Right [[id number]] Resulted: 11/03/22 1234     Updated: 11/03/22 1235    Narrative:      Right Finger X-Ray    Indication: Pain    Views:  AP, Lateral right ring digit    Comparison: None    Findings:  No fracture  No bony lesion  Normal soft tissues  Normal joint spaces    Impression: Negative right ring digit x-ray for acute bony abnormality          We have reviewed images brought in with the patient from 9/13/2022 of her left knee and right hand.  We have also reviewed the reports.  No acute bony abnormalities are noted.    Assessment    Assessment:  1. Work related injury    2. Primary osteoarthritis of left knee    3. Injury of left knee, initial encounter    4. Injury of right hand, initial encounter        Plan:  Continue over-the-counter medication as needed for discomfort  Work-related injury right hand and left knee--at this point, I do not think further treatment is necessary with regards to imaging.  No injections are needed in the left knee for now.  Formal therapy on the right hand and left knee will be requested.  Follow-up in 2 months to see how she is doing overall.        Milton Romero MD  11/03/22  12:52 EDT      Dictated Utilizing Dragon Dictation.

## 2022-11-08 ENCOUNTER — OFFICE VISIT (OUTPATIENT)
Dept: ORTHOPEDIC SURGERY | Facility: CLINIC | Age: 72
End: 2022-11-08

## 2022-11-08 VITALS
HEIGHT: 63 IN | SYSTOLIC BLOOD PRESSURE: 148 MMHG | BODY MASS INDEX: 35.79 KG/M2 | WEIGHT: 202 LBS | DIASTOLIC BLOOD PRESSURE: 80 MMHG
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
## 2022-11-08 DIAGNOSIS — Z96.651 STATUS POST TOTAL RIGHT KNEE REPLACEMENT: Primary | ICD-10-CM

## 2022-11-08 DIAGNOSIS — R20.0 NUMBNESS AND TINGLING OF RIGHT LEG: ICD-10-CM

## 2022-11-08 DIAGNOSIS — R20.2 NUMBNESS AND TINGLING OF RIGHT LEG: ICD-10-CM

## 2022-11-08 DIAGNOSIS — M54.41 ACUTE RIGHT-SIDED LOW BACK PAIN WITH RIGHT-SIDED SCIATICA: ICD-10-CM

## 2022-11-08 PROCEDURE — 99213 OFFICE O/P EST LOW 20 MIN: CPT | Performed by: ORTHOPAEDIC SURGERY

## 2022-11-08 NOTE — PROGRESS NOTES
"    [facility] Orthopaedic Surgery Clinic Note        Subjective     CC: Follow-up (1 week f/u-- Total knee arthroplasty on right  2/8/17)      IVONE    Ritu Gtz is a 72 y.o. female.  Patient is here today for evaluation of her right knee and leg.  She had a fall on 9/13/2022 and a few weeks later started having swelling and numbness and tingling in her right leg.  She has had prior low back surgery.  I replaced her right knee in February 2017 and she had a relatively uneventful recovery.  No prior trouble with the knee until she fell.  She said her back was doing pretty well as well.    Overall, patient's symptoms are as above.  We are communicating with her through the language line .    ROS:    Constiutional:Pt denies fever, chills, nausea, or vomiting.  MSK:as above        Objective      Past Medical History  Past Medical History:   Diagnosis Date   • Anemia    • Arthritis    • Asthma    • Chronic bronchitis (HCC)    • Deaf    • History of complications due to general anesthesia    • Hyperlipidemia    • Hypertension    • Osteoporosis    • PONV (postoperative nausea and vomiting)    • Primary osteoarthritis of both knees    • Rheumatoid arthritis (HCC)    • SOB (shortness of breath)    • Stroke (HCC)    • Wears dentures    • Wears glasses          Physical Exam  /80   Ht 160 cm (62.99\")   Wt 91.6 kg (202 lb)   BMI 35.79 kg/m²     Body mass index is 35.79 kg/m².    Patient is well nourished and well developed.        Ortho Exam  Range of motion 0-1 20  No instability noted to varus or valgus force at 0 or 30 degrees  Nontender over the medial or lateral joint lines  Normal patella tracking  Nontender over the patella  Calf has a mild increase in warmth and mild swelling  Calf is nontender  5-5 EHL, FHL, tibialis anterior, gastroc    Imaging/Labs/EMG Reviewed:  Imaging Results (Last 24 Hours)     ** No results found for the last 24 hours. **        X-ray taken of her right knee today in the " office shows no evidence of any loosening or fracture associated with her total knee implant    Assessment    Assessment:  1. Status post total right knee replacement    2. Fall from slip, trip, or stumble, initial encounter    3. Acute right-sided low back pain with right-sided sciatica    4. Numbness and tingling of right leg        Plan:  Recommend over the counter anti-inflammatories for pain and/or swelling  Patient has right leg numbness and tingling and swelling after a fall with a history of low back surgery in the past--this is likely related either to an acute peroneal nerve injury that is primarily sensory or exacerbation of her underlying lumbar condition.  Either way I told her it can take a little bit of time to improve.  I want to get her some compression socks today.  See her back in 8 to 9 weeks and see how she is doing overall.  If she is not a lot better, she will be sent likely for low back evaluation to whoever did her surgery.      Milton Romero MD  11/08/22  09:23 EST      Dictated Utilizing Dragon Dictation.

## 2022-12-02 ENCOUNTER — TELEPHONE (OUTPATIENT)
Dept: ORTHOPEDIC SURGERY | Facility: CLINIC | Age: 72
End: 2022-12-02

## 2022-12-02 NOTE — TELEPHONE ENCOUNTER
LVM requesting a call back to discuss.    Shahab WANG CMA ([facility]), ROT

## 2022-12-02 NOTE — TELEPHONE ENCOUNTER
[facility] ([phone], fax # [phone]) is following up regarding paperwork. They would like a call back.

## 2022-12-05 NOTE — TELEPHONE ENCOUNTER
LVM with [employer] to return my call to discuss needed paperwork.    Shahab WANG CMA ([facility]), ROT

## 2022-12-07 ENCOUNTER — TELEPHONE (OUTPATIENT)
Dept: ORTHOPEDIC SURGERY | Facility: CLINIC | Age: 72
End: 2022-12-07

## 2023-01-09 ENCOUNTER — TELEPHONE (OUTPATIENT)
Dept: ORTHOPEDIC SURGERY | Facility: CLINIC | Age: 73
End: 2023-01-09
Payer: MEDICARE
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
## 2023-01-09 DIAGNOSIS — S69.91XA INJURY OF RIGHT HAND, INITIAL ENCOUNTER: ICD-10-CM

## 2023-01-09 NOTE — TELEPHONE ENCOUNTER
[facility] CALLED STATING THEY NEED A NEW OCCUPATIONAL THERAPIST ORDER FOR THE RIGHT HAND. THEY STATED THAT THEY HAVE THE PT ORDER, BUT THEIR OCCUPATIONAL THERAPIST IS THE ONE THAT ACTUALLY TREATS THE HAND AT THEIR FACILITY. THEY JUST NEED THE ORDER TO REFLECT OCCUPATIONAL THERAPY INSTEAD OF PHYSICAL THERAPY.    FAX #: [phone]  PHONE #: [phone]

## 2023-02-28 ENCOUNTER — OFFICE VISIT (OUTPATIENT)
Dept: ORTHOPEDIC SURGERY | Facility: CLINIC | Age: 73
End: 2023-02-28
Payer: OTHER MISCELLANEOUS

## 2023-02-28 VITALS
BODY MASS INDEX: 34.38 KG/M2 | SYSTOLIC BLOOD PRESSURE: 128 MMHG | DIASTOLIC BLOOD PRESSURE: 88 MMHG | HEIGHT: 63 IN | WEIGHT: 194 LBS
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
## 2023-02-28 DIAGNOSIS — S69.91XS HAND INJURY, RIGHT, SEQUELA: Primary | ICD-10-CM

## 2023-02-28 DIAGNOSIS — R20.0 NUMBNESS AND TINGLING OF RIGHT LEG: ICD-10-CM
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
## 2023-02-28 DIAGNOSIS — M54.41 ACUTE RIGHT-SIDED LOW BACK PAIN WITH RIGHT-SIDED SCIATICA: ICD-10-CM

## 2023-02-28 DIAGNOSIS — R20.2 NUMBNESS AND TINGLING OF RIGHT LEG: ICD-10-CM

## 2023-02-28 DIAGNOSIS — M17.12 PRIMARY OSTEOARTHRITIS OF LEFT KNEE: ICD-10-CM

## 2023-02-28 PROCEDURE — 99213 OFFICE O/P EST LOW 20 MIN: CPT | Performed by: ORTHOPAEDIC SURGERY

## 2023-02-28 NOTE — PROGRESS NOTES
"    [facility] Orthopaedic Surgery Clinic Note        Subjective     CC: Follow-up (3 month f/u-- Primary osteoarthritis of left knee; Injury of right hand)      HPI    Ritu Gtz is a 72 y.o. female.  Patient returns the office today using the assistance of the language line  for reevaluation of her work-related injury to her right hand and left knee.  Patient tells me that due to scheduling difficulties, she has only been to therapy 3 times.  She has yet to be seen for her right hand due to scheduling difficulties.  She says her right leg numbness and swelling is still present but better.    Overall, patient's symptoms are as above.    ROS:    Constiutional:Pt denies fever, chills, nausea, or vomiting.  MSK:as above        Objective      Past Medical History  Past Medical History:   Diagnosis Date   • Anemia    • Arthritis    • Asthma    • Chronic bronchitis (HCC)    • Deaf    • History of complications due to general anesthesia    • Hyperlipidemia    • Hypertension    • Osteoporosis    • PONV (postoperative nausea and vomiting)    • Primary osteoarthritis of both knees    • Rheumatoid arthritis (HCC)    • SOB (shortness of breath)    • Stroke (HCC)    • Wears dentures    • Wears glasses      Social History     Socioeconomic History   • Marital status:    Tobacco Use   • Smoking status: Never   • Smokeless tobacco: Never   Vaping Use   • Vaping Use: Never used   Substance and Sexual Activity   • Alcohol use: No   • Drug use: No   • Sexual activity: Not Currently     Partners: Male     Birth control/protection: None          Physical Exam  /88   Ht 160 cm (62.99\")   Wt 88 kg (194 lb)   BMI 34.37 kg/m²     Body mass index is 34.37 kg/m².    Patient is well nourished and well developed.        Ortho Exam  Patient is  with range of motion of the right hand.  With regards to her left knee, she has medial joint line tenderness.  She has range of motion of 10-1 10.  No instability " detected.    Imaging/Labs/EMG Reviewed:  Imaging Results (Last 24 Hours)     ** No results found for the last 24 hours. **            Assessment    Assessment:  1. Hand injury, right, sequela    2. Work related injury    3. Fall on same level from slipping, tripping or stumbling, subsequent encounter    4. Acute right-sided low back pain with right-sided sciatica    5. Numbness and tingling of right leg    6. Primary osteoarthritis of left knee        Plan:  Recommend over the counter anti-inflammatories for pain and/or swelling  Work-related right hand injury--occupational therapy will be reordered.  We have instructed the patient to call to schedule an appointment at the [facility] with OT  Work-related low back injury with numbness and tingling of the right leg and left knee arthritis--continue aquatic therapy.  Follow-up in 2 to 3 months for reevaluation.  If her back is still an issue, she will be asked to go see her low back doctor.  If her hand is still problematic, repeat x-ray will be ordered.      Milton Romero MD  02/28/23  12:58 EST      Dictated Utilizing Dragon Dictation.

## 2023-05-30 ENCOUNTER — OFFICE VISIT (OUTPATIENT)
Dept: ORTHOPEDIC SURGERY | Facility: CLINIC | Age: 73
End: 2023-05-30

## 2023-05-30 VITALS
WEIGHT: 195.6 LBS | HEIGHT: 63 IN | SYSTOLIC BLOOD PRESSURE: 132 MMHG | BODY MASS INDEX: 34.66 KG/M2 | DIASTOLIC BLOOD PRESSURE: 86 MMHG

## 2023-05-30 DIAGNOSIS — R20.2 NUMBNESS AND TINGLING OF RIGHT LEG: ICD-10-CM

## 2023-05-30 DIAGNOSIS — R20.0 NUMBNESS AND TINGLING OF RIGHT LEG: ICD-10-CM
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
## 2023-05-30 DIAGNOSIS — M17.12 PRIMARY OSTEOARTHRITIS OF LEFT KNEE: Primary | ICD-10-CM

## 2023-05-30 DIAGNOSIS — M54.41 ACUTE RIGHT-SIDED LOW BACK PAIN WITH RIGHT-SIDED SCIATICA: ICD-10-CM

## 2023-05-30 DIAGNOSIS — S69.91XS HAND INJURY, RIGHT, SEQUELA: ICD-10-CM
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
## 2023-05-30 PROCEDURE — 99213 OFFICE O/P EST LOW 20 MIN: CPT | Performed by: ORTHOPAEDIC SURGERY

## 2023-05-30 RX ORDER — ASPIRIN 81 MG
TABLET,CHEWABLE ORAL
COMMUNITY
Start: 2023-05-24

## 2023-05-30 NOTE — PROGRESS NOTES
[facility] [place] [facility] Note        Subjective     CC: Follow-up (3 month follow up -- Primary osteoarthritis of left knee; Injury of right hand )      HPI    Ritu Gtz is a 72 y.o. female.  Patient returns for follow-up of her work-related injury to her right hand and left knee and her low back.  Patient is seen today with the assistance of the language line,  [place] #[id number].  Patient tells me that her fingers better and the swelling has improved.  She has full use of the hand.  Regards to her low back, she says she continues to have trouble when she stands for too long and says that things started to pinch a little bit in her legs tire easily.  This is particularly true when she goes shopping at [employer] or [employer].    Overall, patient's symptoms are as above    ROS:    Constiutional:Pt denies fever, chills, nausea, or vomiting.  MSK:as above        Objective      Past Medical History  Past Medical History:   Diagnosis Date   • Anemia    • Arthritis    • Arthritis of back FEB 15    working on stressed causes back pain   • Arthritis of neck January 2023    Pillow appropriate for my neck   • Asthma    • Cervical disc disorder degeneration ?   • Chronic bronchitis    • Deaf    • Fracture, finger January 2023    uncomfortable, ring is hard to put on   • History of complications due to general anesthesia    • Hyperlipidemia    • Hypertension    • Low back strain    • Neck strain    • Osteoporosis    • PONV (postoperative nausea and vomiting)    • Primary osteoarthritis of both knees    • Rheumatoid arthritis    • SOB (shortness of breath)    • Stroke    • Wears dentures    • Wears glasses      Social History     Socioeconomic History   • Marital status:    Tobacco Use   • Smoking status: Never   • Smokeless tobacco: Never   Vaping Use   • Vaping Use: Never used   Substance and Sexual Activity   • Alcohol use: No   • Drug use: No   • Sexual activity: Not Currently     Partners: Male      "Birth control/protection: None, Hysterectomy          Physical Exam  /86   Ht 158.9 cm (62.56\")   Wt 88.7 kg (195 lb 9.6 oz)   BMI 35.14 kg/m²     Body mass index is 35.14 kg/m².    Patient is well nourished and well developed.        Ortho Exam  Patient has full tip to palm flexion of her right hand and full extension.  Nontender to palpation.  There is no appreciable swelling at the MCP, PIP, or DIP.    Imaging/Labs/EMG Reviewed:  Imaging Results (Last 24 Hours)     Procedure Component Value Units Date/Time    XR Finger 2+ View Right [[id number]] Resulted: 05/30/23 1219     Updated: 05/30/23 1220    Narrative:      Right ring finger X-Ray    Indication: Pain    Views:  AP, Lateral, and Oblique     Comparison: Right ring digit 11/3/2022    Findings:  No fracture  No bony lesion  Normal soft tissues  Joint space narrowing noted at the DIP joint.    Impression: No acute bony abnormality right ring digit              Assessment    Assessment:  1. Primary osteoarthritis of left knee    2. Hand injury, right, sequela    3. Work related injury    4. Fall on same level from slipping, tripping or stumbling, subsequent encounter    5. Acute right-sided low back pain with right-sided sciatica    6. Numbness and tingling of right leg        Plan:  Recommend over the counter anti-inflammatories for pain and/or swelling  Right hand injury secondary to work related injury--patient be at [facility] today.  Impairment rating if needed can be calculated at a later date we will likely need to do her  and pinch strength  Left knee injury in the face of underlying arthritis--observe for now.  Appears to be improving  Numbness and tingling right leg with low back pain--I told the patient through the  that if she continues to have pain or difficulty with her low back that I would strongly recommend she get an evaluation by a neurosurgeon or orthopedic spine surgeon who can evaluate her and treat her further.  Sounds like " she is having neurogenic claudication.      Milton Romero MD  05/30/23  13:02 EDT      Dictated Utilizing Dragon Dictation.

## 2023-05-31 ENCOUNTER — TELEPHONE (OUTPATIENT)
Dept: ORTHOPEDIC SURGERY | Facility: CLINIC | Age: 73
End: 2023-05-31

## 2023-05-31 NOTE — TELEPHONE ENCOUNTER
Caller: DINORA SANTANA    [facility]    Best call back number: [phone]    What form or medical record are you requesting: PN FROM 05/30/23 & UPDATED WORK STATUS. ALSO, IS THERE A FOLLOW UP VISIT PLANNED    Who is requesting this form or medical record from you: W/C    How would you like to receive the form or medical records (pick-up, mail, fax):   FAX: [phone]    Timeframe paperwork needed: ASAP

## 2023-08-24 ENCOUNTER — TELEPHONE (OUTPATIENT)
Dept: NEUROLOGY | Facility: CLINIC | Age: 73
End: 2023-08-24
Payer: MEDICARE

## 2023-08-24 NOTE — TELEPHONE ENCOUNTER
Tried to contact Pt to reschedule appt on 9/26/23 due to Provider being out that day. Pt asked to communicate another way. Interpretor needed.     Message sent via [employer].

## 2023-09-27 ENCOUNTER — PATIENT MESSAGE (OUTPATIENT)
Dept: NEUROLOGY | Facility: CLINIC | Age: 73
End: 2023-09-27
Payer: MEDICARE

## 2023-09-27 NOTE — TELEPHONE ENCOUNTER
From: Ritu Gtz  To: Sherry Sow  Sent: 9/27/2023 8:09 AM EDT  Subject: Reschedule appt please    Reschedule for my appt at 9/28/23 at 11;30 to another Friday afternoon sometime in November? 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 Denisse

## 2023-11-10 ENCOUNTER — LAB (OUTPATIENT)
Dept: LAB | Facility: HOSPITAL | Age: 73
End: 2023-11-10
Payer: MEDICARE

## 2023-11-10 ENCOUNTER — OFFICE VISIT (OUTPATIENT)
Dept: NEUROLOGY | Facility: CLINIC | Age: 73
End: 2023-11-10
Payer: MEDICARE

## 2023-11-10 VITALS
BODY MASS INDEX: 36.11 KG/M2 | HEART RATE: 70 BPM | WEIGHT: 203.8 LBS | HEIGHT: 63 IN | SYSTOLIC BLOOD PRESSURE: 122 MMHG | OXYGEN SATURATION: 96 % | DIASTOLIC BLOOD PRESSURE: 78 MMHG

## 2023-11-10 DIAGNOSIS — Z86.73 HISTORY OF STROKE: Primary | ICD-10-CM

## 2023-11-10 DIAGNOSIS — R41.3 MEMORY LOSS: ICD-10-CM

## 2023-11-10 DIAGNOSIS — R60.0 LOCALIZED EDEMA: ICD-10-CM

## 2023-11-10 PROCEDURE — 36415 COLL VENOUS BLD VENIPUNCTURE: CPT

## 2023-11-10 PROCEDURE — 80053 COMPREHEN METABOLIC PANEL: CPT

## 2023-11-10 RX ORDER — ATORVASTATIN CALCIUM 40 MG/1
40 TABLET, FILM COATED ORAL
Qty: 90 TABLET | Refills: 11 | Status: SHIPPED | OUTPATIENT
Start: 2023-11-10

## 2023-11-10 RX ORDER — FUROSEMIDE 20 MG/1
20 TABLET ORAL AS NEEDED
Qty: 30 TABLET | Refills: 11 | Status: SHIPPED | OUTPATIENT
Start: 2023-11-10 | End: 2024-11-09

## 2023-11-10 NOTE — PROGRESS NOTES
Neuro Office Visit      Encounter Date: 11/10/2023   Patient Name: Ritu Gtz  : 1950   MRN: [id number]   PCP:  Dustin Plaza MD     Chief Complaint:    Chief Complaint   Patient presents with    Stroke     F/U       History of Present Illness: Ritu Gtz is a 73 y.o. female who is here today in Neurology for history of stroke.    Last visit with me on 2022 continued aspirin and atorvastatin.    Ritu is deaf and an  was used to assist with the visit.    History of Stroke:   No further symptoms, at baseline function.    Continues ASA and atorvastatin.    PH:  Admitted to [facility] on 1/15/20 for stroke like symptoms, difficulty signing (pt is deaf)weakness RUE/RLE and numbness on the right side of her body. MRI acute infarct left medial parietal lobe. CTA neck no significant stenosis. Echo with negative bubble study. Holter monitor no A-Fib. Discharged on ASA/atorvastatin and Plavix for 90 days.     Memory loss:  Often forgets what she is supposed to be doing.    Forgets the topic of conversation.    MMSE 28/30.    Lower extremity edema:  Intermittent lower extremity edema.    Today she has 3+ nonpitting edema in both legs but it is greater in the right than the left.    This has been ongoing for several months.    Denies any chest pain.    Admits to shortness of air when she is going upstairs.    Previously had a similar issue for which she was on Lasix.    Subjective      Past Medical History:   Past Medical History:   Diagnosis Date    Anemia     Arthritis     Arthritis of back FEB 15    working on stressed causes back pain    Arthritis of neck 2023    Pillow appropriate for my neck    Asthma     Cervical disc disorder degeneration ?    Chronic bronchitis     Deaf     Fracture, finger 2023    uncomfortable, ring is hard to put on    History of complications due to general anesthesia     Hyperlipidemia     Hypertension     Low back strain     Memory loss      Neck strain     Osteoporosis     Peripheral neuropathy     PONV (postoperative nausea and vomiting)     Primary osteoarthritis of both knees     Rheumatoid arthritis     SOB (shortness of breath)     Stroke     TIA (transient ischemic attack)     Wears dentures     Wears glasses        Past Surgical History:   Past Surgical History:   Procedure Laterality Date    APPENDECTOMY      BACK SURGERY  last spring 2022 Mild    COLONOSCOPY      FRACTURE SURGERY      triggered thumb    HYSTERECTOMY      KNEE SURGERY  In file    Full knee replacement (right)    PARTIAL KNEE ARTHROPLASTY Right     OR ARTHRP KNE CONDYLE&PLATU MEDIAL&LAT COMPARTMENTS Right 2017    Procedure: RIGHT TOTAL KNEE ARTHROPLASTY;  Surgeon: Milton Romero MD;  Location: [facility];  Service: Orthopedics    SHOULDER SURGERY      scope with ligament repair     TONSILLECTOMY         Family History:   Family History   Problem Relation Age of Onset    Cancer Mother     Depression Mother     Arthritis Mother     Bone cancer Mother     Dementia Mother     Cancer Father     Diabetes Father     Arthritis Father     Alcohol abuse Father     Diabetes insipidus Father     Heart disease Father     Hypertension Father     Hyperlipidemia Father     Stroke Father             Arthritis Sister        Social History:   Social History     Socioeconomic History    Marital status:    Tobacco Use    Smoking status: Never    Smokeless tobacco: Never   Vaping Use    Vaping Use: Never used   Substance and Sexual Activity    Alcohol use: No    Drug use: No    Sexual activity: Not Currently     Partners: Male     Birth control/protection: None, Hysterectomy       Medications:     Current Outpatient Medications:     Acetaminophen (TYLENOL ARTHRITIS PAIN PO), Take  by mouth As Needed., Disp: , Rfl:     amLODIPine (NORVASC) 5 MG tablet, Take 1 tablet by mouth Daily., Disp: , Rfl:     Aspirin Low Dose 81 MG chewable tablet, CHEW 2 TABLETS BY MOUTH DAILY,  Disp: , Rfl:     atorvastatin (LIPITOR) 40 MG tablet, Take 1 tablet by mouth every night at bedtime., Disp: 90 tablet, Rfl: 11    Cyanocobalamin (VITAMIN B-12 CR PO), Take 1 tablet by mouth Daily., Disp: , Rfl:     fluticasone (FLONASE) 50 MCG/ACT nasal spray, 2 sprays by Each Nare route Daily., Disp: , Rfl:     Mirabegron ER (MYRBETRIQ) 50 MG tablet sustained-release 24 hour 24 hr tablet, Take 50 mg by mouth Daily., Disp: , Rfl:     pantoprazole (PROTONIX) 40 MG EC tablet, Take 1 tablet by mouth Daily., Disp: , Rfl:     vitamin C (ASCORBIC ACID) 500 MG tablet, Take 1 tablet by mouth Daily., Disp: , Rfl:     vitamin D (ERGOCALCIFEROL) 1.25 MG (40136 UT) capsule capsule, Take 1 capsule by mouth 1 (One) Time Per Week., Disp: , Rfl:     furosemide (Lasix) 20 MG tablet, Take 1 tablet by mouth As Needed (Edema)., Disp: 30 tablet, Rfl: 11    Allergies:   Allergies   Allergen Reactions    Darvon [Propoxyphene] Other (See Comments)     Passed out    Onion        PHQ-9 Total Score:     STEADI Fall Risk Assessment was completed, and patient is at LOW risk for falls.Assessment completed on:11/10/2023    Objective     Physical Exam:   Physical Exam  Vitals reviewed.   Eyes:      Extraocular Movements: EOM normal.      Pupils: Pupils are equal, round, and reactive to light.   Neurological:      Mental Status: She is oriented to person, place, and time.      Gait: Gait is intact.   Psychiatric:         Speech: Speech normal.         Neurologic Exam     Mental Status   Oriented to person, place, and time.   Attention: normal. Concentration: normal.   Speech: speech is normal   Level of consciousness: alert  Knowledge: good.     Cranial Nerves     CN II   Visual fields full to confrontation.     CN III, IV, VI   Pupils are equal, round, and reactive to light.  Extraocular motions are normal.     CN V   Facial sensation intact.     CN VII   Facial expression full, symmetric.     CN VIII   CN VIII normal.     Motor Exam     Strength  "  Right neck flexion: 5/5  Left neck flexion: 5/5  Right neck extension: 5/5  Left neck extension: 5/5  Right deltoid: 5/5  Left deltoid: 5/5  Right biceps: 5/5  Left biceps: 5/5  Right triceps: 5/5  Left triceps: 5/5  Right wrist flexion: 5/5  Left wrist flexion: 5/5  Right wrist extension: 5/5  Left wrist extension: 5/5  Right interossei: 5/5  Left interossei: 5/5  Right abdominals: 5/5  Left abdominals: 5/5  Right iliopsoas: 5/5  Left iliopsoas: 5/5  Right quadriceps: 5/5  Left quadriceps: 5/5  Right hamstrin/5  Left hamstrin/5  Right glutei: 5/5  Left glutei: 5/5  Right anterior tibial: 5/5  Left anterior tibial: 5/5  Right posterior tibial: 5/5  Left posterior tibial: 5/5  Right peroneal: 5/5  Left peroneal: 5/5  Right gastroc: 5/5  Left gastroc: 5/5    Sensory Exam   Light touch normal.     Gait, Coordination, and Reflexes     Gait  Gait: normal       Vital Signs:   Vitals:    11/10/23 1350   BP: 122/78   Pulse: 70   SpO2: 96%   Weight: 92.4 kg (203 lb 12.8 oz)   Height: 158.9 cm (62.56\")     Body mass index is 36.61 kg/m².     Results:   Imaging:   No Images in the past 120 days found..     Labs:    No results found for: \"CMP\", \"PROTEIN\", \"ANTIMOGAB\", \"CXPTIH7FHBT\", \"JCVRESULT\", \"QUANTTBGOLD\", \"CBCDIF\", \"IGGALBSER\"     Assessment / Plan      Assessment/Plan:   Diagnoses and all orders for this visit:    1. History of stroke (Primary)  Comments:  Continue ASA and Lipitor    2. Memory loss    3. Localized edema  Comments:  Start Lasix as needed  Orders:  -     Comprehensive Metabolic Panel; Future    Other orders  -     furosemide (Lasix) 20 MG tablet; Take 1 tablet by mouth As Needed (Edema).  Dispense: 30 tablet; Refill: 11  -     atorvastatin (LIPITOR) 40 MG tablet; Take 1 tablet by mouth every night at bedtime.  Dispense: 90 tablet; Refill: 11           Patient Education:     Reviewed medications, potential side effects and signs and symptoms to report. Discussed risk versus benefits of treatment " plan with patient and/or family-including medications, labs and radiology that may be ordered. Addressed questions and concerns during visit. Patient and/or family verbalized understanding and agree with plan. Instructed to call the office with any questions and report to ER with any life-threatening symptoms.     Follow Up:   Return in about 1 year (around 11/10/2024).    I spent 60 minutes face to face with the patient and video  . I personally spent 50 percent of this time counseling and discussing diagnosis, diagnostic testing, driving, treatment options, and management .       During this visit the following were done:  Labs Reviewed []    Labs Ordered [x]    Radiology Reports Reviewed []    Radiology Ordered []    PCP Records Reviewed []    Referring Provider Records Reviewed []    ER Records Reviewed []    Hospital Records Reviewed []    History Obtained From Family []    Radiology Images Reviewed []    Other Reviewed []    Records Requested []      MER Tinajero  [facility] NEUROLOGY  [address] [place] [address] [address]  [facility] [id number] [phone]

## 2023-11-11 LAB
ALBUMIN SERPL-MCNC: 4.7 G/DL (ref 3.5–5.2)
ALBUMIN/GLOB SERPL: 1.4 G/DL
ALP SERPL-CCNC: 131 U/L (ref 39–117)
ALT SERPL W P-5'-P-CCNC: 23 U/L (ref 1–33)
ANION GAP SERPL CALCULATED.3IONS-SCNC: 8 MMOL/L (ref 5–15)
AST SERPL-CCNC: 32 U/L (ref 1–32)
BILIRUB SERPL-MCNC: 0.5 MG/DL (ref 0–1.2)
BUN SERPL-MCNC: 14 MG/DL (ref 8–23)
BUN/CREAT SERPL: 14.3 (ref 7–25)
CALCIUM SPEC-SCNC: 10.5 MG/DL (ref 8.6–10.5)
CHLORIDE SERPL-SCNC: 104 MMOL/L (ref 98–107)
CO2 SERPL-SCNC: 29 MMOL/L (ref 22–29)
CREAT SERPL-MCNC: 0.98 MG/DL (ref 0.57–1)
EGFRCR SERPLBLD CKD-EPI 2021: 61.1 ML/MIN/1.73
GLOBULIN UR ELPH-MCNC: 3.4 GM/DL
GLUCOSE SERPL-MCNC: 83 MG/DL (ref 65–99)
POTASSIUM SERPL-SCNC: 4.8 MMOL/L (ref 3.5–5.2)
PROT SERPL-MCNC: 8.1 G/DL (ref 6–8.5)
SODIUM SERPL-SCNC: 141 MMOL/L (ref 136–145)

## 2023-11-13 ENCOUNTER — PATIENT MESSAGE (OUTPATIENT)
Dept: NEUROLOGY | Facility: CLINIC | Age: 73
End: 2023-11-13
Payer: MEDICARE

## 2023-11-15 NOTE — TELEPHONE ENCOUNTER
From: Stefanie BARRAGAN  To: Ritu Gtz  Sent: 11/13/2023 3:06 PM EST  Subject: Lab Results    Aiden Crystal Billie Jean, APRN:    Your lab work was normal with the exception of a mildly elevated liver enzyme that has been elevated for the last 3 years. You will need to follow-up with your primary care provider for that.   If you have any questions or concerns, please let us know.     Thank you,  Stefanie DOUGLAS

## 2023-11-16 ENCOUNTER — TELEPHONE (OUTPATIENT)
Dept: NEUROLOGY | Facility: CLINIC | Age: 73
End: 2023-11-16
Payer: MEDICARE

## 2023-11-16 NOTE — TELEPHONE ENCOUNTER
"Regarding: Lab Results  Contact: [phone]  Would you please send the lab report to her physician office in [place]?      ----- Message -----  From: Stefanie Amaral MA  Sent: 11/15/2023   8:22 AM EST  To: MER Tinajreo  Subject: Lab Results                                      ----- Message from Stefanie Amaral MA sent at 11/15/2023  8:22 AM EST -----       ----- Message from Ritu Gtz \"DENISSE\" to Stefanie Amaral MA sent at 11/14/2023  8:25 PM -----   Please send the report to my physician, Dr. Dustin Plaza in [place], [place]. I have an appt with him this Thursday at 3pm.   Denisse      ----- Message -----       From:Stefanie BARRAGAN       Sent:11/13/2023  3:06 PM EST         To:Ritu Gtz    Subject:Lab Results    Hi Aiden Salcedo Billie Jean, APRN:    Your lab work was normal with the exception of a mildly elevated liver enzyme that has been elevated for the last 3 years. You will need to follow-up with your primary care provider for that.   If you have any questions or concerns, please let us know.     Thank you,  Stefanie DOUGLAS  "

## 2024-05-01 ENCOUNTER — APPOINTMENT (RX ONLY)
Dept: URBAN - METROPOLITAN AREA CLINIC 45 | Facility: CLINIC | Age: 74
Setting detail: DERMATOLOGY
End: 2024-05-01

## 2024-05-01 DIAGNOSIS — L21.8 OTHER SEBORRHEIC DERMATITIS: ICD-10-CM | Status: INADEQUATELY CONTROLLED

## 2024-05-01 PROCEDURE — ? PRESCRIPTION MEDICATION MANAGEMENT

## 2024-05-01 PROCEDURE — ? PRESCRIPTION

## 2024-05-01 PROCEDURE — 99204 OFFICE O/P NEW MOD 45 MIN: CPT

## 2024-05-01 PROCEDURE — ? COUNSELING

## 2024-05-01 RX ORDER — CICLOPIROX 7.7 MG/G
APPLY GEL TOPICAL DAILY
Qty: 45 | Refills: 2 | Status: ERX | COMMUNITY
Start: 2024-05-01

## 2024-05-01 RX ADMIN — CICLOPIROX APPLY: 7.7 GEL TOPICAL at 00:00

## 2024-05-01 ASSESSMENT — LOCATION ZONE DERM: LOCATION ZONE: SCALP

## 2024-05-01 ASSESSMENT — LOCATION DETAILED DESCRIPTION DERM
LOCATION DETAILED: LEFT INFERIOR FRONTAL SCALP
LOCATION DETAILED: RIGHT INFERIOR FRONTAL SCALP
LOCATION DETAILED: RIGHT MEDIAL FRONTAL SCALP
LOCATION DETAILED: LEFT CENTRAL FRONTAL SCALP

## 2024-05-01 ASSESSMENT — LOCATION SIMPLE DESCRIPTION DERM
LOCATION SIMPLE: LEFT SCALP
LOCATION SIMPLE: SCALP
LOCATION SIMPLE: RIGHT SCALP

## 2024-05-01 NOTE — PROCEDURE: PRESCRIPTION MEDICATION MANAGEMENT
Initiate Treatment: ciclopirox 0.77 % topical gel\\nQuantity: 45.0 g  Days Supply: 30\\nSig: Apply to affected areas daily x1-2 weeks, then PRN maintenance
Samples Given: VaniCream Shampoo, moisturizers and cleansers
Detail Level: Zone
Render In Strict Bullet Format?: No

## 2024-05-01 NOTE — HPI: ITCHING (SCALP)
How Did The Scalp Condition Occur?: sudden in onset (over a period of weeks to a few months)
How Severe Is The Scalp Condition?: moderate
Additional History: Patient states her scalp is sensitive and she cannot use any shampoos with fragrances. She currently is using tea tree shampoo, but sometimes it burns her scalp as well.

## 2024-07-24 ENCOUNTER — APPOINTMENT (RX ONLY)
Dept: URBAN - METROPOLITAN AREA CLINIC 45 | Facility: CLINIC | Age: 74
Setting detail: DERMATOLOGY
End: 2024-07-24

## 2024-07-24 DIAGNOSIS — L82.0 INFLAMED SEBORRHEIC KERATOSIS: ICD-10-CM

## 2024-07-24 DIAGNOSIS — L82.1 OTHER SEBORRHEIC KERATOSIS: ICD-10-CM

## 2024-07-24 PROCEDURE — 99212 OFFICE O/P EST SF 10 MIN: CPT | Mod: 25

## 2024-07-24 PROCEDURE — 17110 DESTRUCTION B9 LES UP TO 14: CPT

## 2024-07-24 PROCEDURE — ? COUNSELING

## 2024-07-24 PROCEDURE — ? LIQUID NITROGEN

## 2024-07-24 ASSESSMENT — LOCATION SIMPLE DESCRIPTION DERM
LOCATION SIMPLE: LEFT ANTERIOR NECK
LOCATION SIMPLE: LEFT SHOULDER
LOCATION SIMPLE: RIGHT UPPER BACK
LOCATION SIMPLE: RIGHT LOWER BACK
LOCATION SIMPLE: LEFT CHEEK
LOCATION SIMPLE: RIGHT CHEEK
LOCATION SIMPLE: LEFT UPPER BACK
LOCATION SIMPLE: RIGHT ANTERIOR NECK

## 2024-07-24 ASSESSMENT — LOCATION DETAILED DESCRIPTION DERM
LOCATION DETAILED: LEFT MEDIAL MALAR CHEEK
LOCATION DETAILED: RIGHT CENTRAL MALAR CHEEK
LOCATION DETAILED: LEFT SUPERIOR UPPER BACK
LOCATION DETAILED: RIGHT LATERAL UPPER BACK
LOCATION DETAILED: RIGHT INFERIOR LATERAL NECK
LOCATION DETAILED: LEFT INFERIOR LATERAL NECK
LOCATION DETAILED: RIGHT MID-UPPER BACK
LOCATION DETAILED: LEFT POSTERIOR SHOULDER
LOCATION DETAILED: RIGHT INFERIOR MEDIAL MIDBACK
LOCATION DETAILED: RIGHT SUPERIOR UPPER BACK
LOCATION DETAILED: LEFT MEDIAL UPPER BACK
LOCATION DETAILED: LEFT SUPERIOR LATERAL MALAR CHEEK
LOCATION DETAILED: LEFT INFERIOR LATERAL UPPER BACK
LOCATION DETAILED: RIGHT SUPERIOR MEDIAL UPPER BACK
LOCATION DETAILED: LEFT SUPERIOR MEDIAL UPPER BACK
LOCATION DETAILED: RIGHT MEDIAL MALAR CHEEK

## 2024-07-24 ASSESSMENT — LOCATION ZONE DERM
LOCATION ZONE: ARM
LOCATION ZONE: NECK
LOCATION ZONE: FACE
LOCATION ZONE: TRUNK

## 2024-07-24 NOTE — PROCEDURE: LIQUID NITROGEN
Medical Necessity Clause: This procedure was medically necessary because the lesions that were treated were:changing and becoming inflammed.
Spray Paint Technique: No
Spray Paint Text: The liquid nitrogen was applied to the skin utilizing a spray paint frosting technique.
Number Of Freeze-Thaw Cycles: 2 freeze-thaw cycles
Show Applicator Variable?: Yes
Detail Level: Simple
Post-Care Instructions: I reviewed with the patient in detail post-care instructions. Patient is to wear sunprotection, and avoid picking at any of the treated lesions. Pt may apply Vaseline to crusted or scabbing areas.
Medical Necessity Information: It is in your best interest to select a reason for this procedure from the list below. All of these items fulfill various CMS LCD requirements except the new and changing color options.
Consent: The patient's consent was obtained including but not limited to risks of crusting, scabbing, blistering, scarring, darker or lighter pigmentary change, recurrence, incomplete removal and infection.

## 2025-01-30 ENCOUNTER — TELEPHONE (OUTPATIENT)
Dept: NEUROLOGY | Facility: CLINIC | Age: 75
End: 2025-01-30

## 2025-01-30 ENCOUNTER — OFFICE VISIT (OUTPATIENT)
Dept: NEUROLOGY | Facility: CLINIC | Age: 75
End: 2025-01-30
Payer: MEDICARE

## 2025-01-30 VITALS
OXYGEN SATURATION: 95 % | HEART RATE: 72 BPM | HEIGHT: 63 IN | BODY MASS INDEX: 37.46 KG/M2 | DIASTOLIC BLOOD PRESSURE: 82 MMHG | WEIGHT: 211.4 LBS | SYSTOLIC BLOOD PRESSURE: 124 MMHG

## 2025-01-30 DIAGNOSIS — Z86.73 HISTORY OF STROKE: Primary | ICD-10-CM

## 2025-01-30 DIAGNOSIS — R41.3 MEMORY LOSS: ICD-10-CM

## 2025-01-30 RX ORDER — ESTRADIOL 0.1 MG/G
CREAM VAGINAL
COMMUNITY
Start: 2024-12-26

## 2025-01-30 RX ORDER — ATORVASTATIN CALCIUM 40 MG/1
40 TABLET, FILM COATED ORAL
Qty: 90 TABLET | Refills: 11 | Status: SHIPPED | OUTPATIENT
Start: 2025-01-30

## 2025-01-30 RX ORDER — ASPIRIN 81 MG
81 TABLET,CHEWABLE ORAL DAILY
Qty: 30 TABLET | Refills: 11 | Status: SHIPPED | OUTPATIENT
Start: 2025-01-30

## 2025-01-30 NOTE — TELEPHONE ENCOUNTER
Bisi called back and has an agent on his way. States he has another appointment he needs to interpret for @ 12:30pm but agreed to fit hers in.   Notified [facility] is running on time so as long as patient &  are both here, should not take too long and thanked her for coming through last minute!    We will reach out to them with her follow up appt after she checks out so the next one is already on the books with their personnel.     Called and updated Denisse. She states appreciation and was on her way!

## 2025-01-30 NOTE — PROGRESS NOTES
Neuro Office Visit      Encounter Date: 2025   Patient Name: Ritu Gtz  : 1950   MRN: [id number]   PCP:  Dustin Plaza MD     Chief Complaint:    Chief Complaint   Patient presents with    Stroke       History of Present Illness: Ritu Gtz is a 74 y.o. female who is here today in Neurology for history of stroke, memory loss.    Last visit with me on 11/10/2023 continued aspirin, Lipitor, started Lasix as needed.  MMSE 28/30.    Ritu is deaf and an  has come to assist with the visit.  History of Present Illness  She reports no new allergies, diagnoses, or surgeries since her last visit.     History of Stroke:  Currently on a daily regimen of aspirin and atorvastatin, with no reported side effects.    She expresses concern about potential warning signs of a transient ischemic attack (TIA). She recalls an incident at home where she experienced difficulty moving one leg onto the bed, but her arm function was unaffected. She later regained normal mobility while ambulating.     PH:  Admitted to [facility] on 1/15/20 for stroke like symptoms, difficulty signing (pt is deaf)weakness RUE/RLE and numbness on the right side of her body. MRI acute infarct left medial parietal lobe. CTA neck no significant stenosis. Echo with negative bubble study. Holter monitor no A-Fib. Discharged on ASA/atorvastatin and Plavix for 90 days.     Memory loss:  She believes her memory is functioning adequately, although she occasionally struggles to recall names of individuals she has not interacted with for an extended period.     Back Pain:  She experiences back pain, which limits her ability to walk long distances. Despite this, she can manage a 2-mile walk with intermittent rest periods.     She plans to consult with a physician in [place] regarding the possibility of a back injection.      Lower Extremity Edema:  She reports intermittent swelling of bilateral lower extremities, which she observed  to decrease during a recent trip to [place] where she engaged in extensive walking.     She also experiences occasional numbness and tingling sensations, particularly when her legs are swollen.     She expresses frustration with the need to wear compression socks due to difficulty in putting them on and concerns about potential hand arthritis, even though she does not have arthritis in her hands.     She is considering the use of zip-up socks as an alternative.     She had been prescribed Lasix but discontinued it due to a perceived adverse effect on her heart. She recalls an episode of dizziness that led to an emergency room visit, where she was advised to discontinue Lasix.            Subjective      Past Medical History:   Past Medical History:   Diagnosis Date    Anemia     Arthritis     Arthritis of back FEB 15    working on stressed causes back pain    Arthritis of neck January 2023    Pillow appropriate for my neck    Asthma     Cervical disc disorder degeneration ?    Chronic bronchitis     Deaf     Fracture, finger January 2023    uncomfortable, ring is hard to put on    History of complications due to general anesthesia     Hyperlipidemia     Hypertension     Low back strain     Memory loss     Neck strain     Osteoporosis     Peripheral neuropathy     PONV (postoperative nausea and vomiting)     Primary osteoarthritis of both knees     Rheumatoid arthritis     SOB (shortness of breath)     Stroke     TIA (transient ischemic attack)     Vision loss 1/17/25    cataracts    Wears dentures     Wears glasses        Past Surgical History:   Past Surgical History:   Procedure Laterality Date    APPENDECTOMY      BACK SURGERY  last spring 2022 Mild    COLONOSCOPY      FRACTURE SURGERY      triggered thumb    HYSTERECTOMY      KNEE SURGERY  In file    Full knee replacement (right)    PARTIAL KNEE ARTHROPLASTY Right     NC ARTHRP KNE CONDYLE&PLATU MEDIAL&LAT COMPARTMENTS Right 02/08/2017    Procedure: RIGHT TOTAL  KNEE ARTHROPLASTY;  Surgeon: Milton Romero MD;  Location: [facility];  Service: Orthopedics    SHOULDER SURGERY      scope with ligament repair     TONSILLECTOMY         Family History:   Family History   Problem Relation Age of Onset    Cancer Mother     Depression Mother     Arthritis Mother     Bone cancer Mother     Dementia Mother     Cancer Father     Diabetes Father     Arthritis Father     Alcohol abuse Father     Diabetes insipidus Father     Heart disease Father     Hypertension Father     Hyperlipidemia Father     Stroke Father             Arthritis Sister        Social History:   Social History     Socioeconomic History    Marital status:    Tobacco Use    Smoking status: Never    Smokeless tobacco: Never   Vaping Use    Vaping status: Never Used   Substance and Sexual Activity    Alcohol use: No    Drug use: No    Sexual activity: Not Currently     Partners: Male     Birth control/protection: None, Hysterectomy       Medications:     Current Outpatient Medications:     Acetaminophen (TYLENOL ARTHRITIS PAIN PO), Take  by mouth As Needed., Disp: , Rfl:     amLODIPine (NORVASC) 5 MG tablet, Take 1 tablet by mouth Daily., Disp: , Rfl:     Aspirin Low Dose 81 MG chewable tablet, Chew 1 tablet Daily., Disp: 30 tablet, Rfl: 11    atorvastatin (LIPITOR) 40 MG tablet, Take 1 tablet by mouth every night at bedtime., Disp: 90 tablet, Rfl: 11    Cyanocobalamin (VITAMIN B-12 CR PO), Take 1 tablet by mouth Daily., Disp: , Rfl:     estradiol (ESTRACE) 0.1 MG/GM vaginal cream, APPLY 1 GRAM VAGINALLY WITH FINGERTIP THREE TIMES PER WEEK AT NIGHT., Disp: , Rfl:     fluticasone (FLONASE) 50 MCG/ACT nasal spray, 2 sprays by Each Nare route Daily., Disp: , Rfl:     Mirabegron ER (MYRBETRIQ) 50 MG tablet sustained-release 24 hour 24 hr tablet, Take 50 mg by mouth Daily., Disp: , Rfl:     pantoprazole (PROTONIX) 40 MG EC tablet, Take 1 tablet by mouth Daily., Disp: , Rfl:     vitamin C (ASCORBIC ACID)  500 MG tablet, Take 1 tablet by mouth Daily., Disp: , Rfl:     vitamin D (ERGOCALCIFEROL) 1.25 MG (88886 UT) capsule capsule, Take 1 capsule by mouth 1 (One) Time Per Week., Disp: , Rfl:     Allergies:   Allergies   Allergen Reactions    Darvon [Propoxyphene] Other (See Comments)     Passed out    Onion        PHQ-9 Total Score:     DELICIA Fall Risk Assessment was completed, and patient is at LOW risk for falls.Assessment completed on:1/30/2025    Objective     Physical Exam:     Neurological Exam  Mental Status  Awake, alert and oriented to person, place and time. Recent and remote memory are intact. Speech is normal. Language is fluent with no aphasia. Attention and concentration are normal. Fund of knowledge is appropriate for level of education.    Cranial Nerves  CN II: Visual acuity is normal.  CN III, IV, VI: Extraocular movements intact bilaterally. Pupils equal round and reactive to light bilaterally.  CN V: Facial sensation is normal.  CN VII: Full and symmetric facial movement.  CN IX, X: Palate elevates symmetrically  CN XI: Shoulder shrug strength is normal.  CN XII: Tongue midline without atrophy or fasciculations.    Motor  Normal muscle bulk throughout. No fasciculations present. Normal muscle tone. Strength is 5/5 throughout all four extremities.    Sensory  Sensation is intact to light touch, pinprick, vibration and proprioception in all four extremities.    Reflexes                                            Right                      Left  Brachioradialis                    2+                         2+  Biceps                                 2+                         2+  Triceps                                2+                         2+  Finger flex                           2+                         2+  Hamstring                            2+                         2+  Patellar                                2+                         2+  Achilles                                2+           "               2+    Coordination    Finger-to-nose, rapid alternating movements and heel-to-shin normal bilaterally without dysmetria.    Gait  Normal casual, toe, heel and tandem gait.        Vital Signs:   Vitals:    01/30/25 1148   BP: 124/82   Pulse: 72   SpO2: 95%   Weight: 95.9 kg (211 lb 6.4 oz)   Height: 158.9 cm (62.56\")     Body mass index is 37.98 kg/m².     Results:   Results  Imaging  MRI from 2020 showed evidence of a stroke on the left side.     Imaging:   No Images in the past 120 days found..     Labs:   No results found for: \"CMP\", \"PROTEIN\", \"ANTIMOGAB\", \"DHLDUE1CCJL\", \"JCVRESULT\", \"QUANTTBGOLD\", \"CBCDIF\", \"IGGALBSER\"     Assessment / Plan      Assessment/Plan:   Diagnoses and all orders for this visit:    1. History of stroke (Primary)  Comments:  Continue aspirin, atorvastatin    2. Memory loss  Comments:  Stable    Other orders  -     atorvastatin (LIPITOR) 40 MG tablet; Take 1 tablet by mouth every night at bedtime.  Dispense: 90 tablet; Refill: 11  -     Aspirin Low Dose 81 MG chewable tablet; Chew 1 tablet Daily.  Dispense: 30 tablet; Refill: 11         Assessment & Plan  1. Edema.  She reports intermittent swelling in her legs, which improves with walking. She has been using compression socks but finds them difficult to manage. She was advised to continue using medical-grade compression socks (20-30 mmHg) during the day and remove them in the evening. She was also reminded that furosemide (Lasix) is intended to help with fluid retention but was advised against its use due to potential heart issues.    2.  Memory issues.  Her memory test scores remain within the normal range, with occasional difficulty recalling names. She was reassured that her current medications, aspirin and atorvastatin, are appropriate for preventing further strokes and mini-strokes. She was educated on the symptoms of a transient ischemic attack (TIA) and advised to seek immediate medical attention if any such " symptoms occur.          Patient Education:     Reviewed medications, potential side effects and signs and symptoms to report. Discussed risk versus benefits of treatment plan with patient and/or family-including medications, labs and radiology that may be ordered. Addressed questions and concerns during visit. Patient and/or family verbalized understanding and agree with plan. Instructed to call the office with any questions and report to ER with any life-threatening symptoms.     Follow Up:   Return in about 1 year (around 1/30/2026).    I spent 40 minutes caring for Ritu on this date of service. This time includes time spent by me in the following activities: preparing for the visit, performing a medically appropriate examination and/or evaluation, counseling and educating the patient/family/caregiver, and documenting information in the medical record.        During this visit the following were done:  Labs Reviewed []    Labs Ordered []    Radiology Reports Reviewed []    Radiology Ordered []    PCP Records Reviewed []    Referring Provider Records Reviewed []    ER Records Reviewed []    Hospital Records Reviewed []    History Obtained From Family []    Radiology Images Reviewed []    Other Reviewed [x]    Records Requested []      Patient or patient representative verbalized consent for the use of Ambient Listening during the visit with  MER Tinajero for chart documentation. 1/30/2025  15:55 EST    MER Tinajero   [facility] NEUROLOGY  [address] [place] [address]  [facility] [id number] [phone]

## 2025-01-30 NOTE — TELEPHONE ENCOUNTER
Nunu had informed that patient spoke with the HUB and was upset to discover there was not an in person  scheduled for her today. She had to use the CART last time and was not happy with this.    Contacted [facility]  services for Deaf/Hard of Hearing and spoke with Bisi who states many prefer this and she would reach out to try and find someone last minute that may be able to come.    [phone]

## 2025-07-17 ENCOUNTER — TELEPHONE (OUTPATIENT)
Dept: ORTHOPEDIC SURGERY | Facility: CLINIC | Age: 75
End: 2025-07-17
Payer: MEDICARE

## 2025-07-17 NOTE — TELEPHONE ENCOUNTER
This patient requires an  for their appointment. Please see the  information below.     Caller: CHAPARRO VELAZQUEZ  Relationship to patient: PATIENT  Best call back number: [phone]   Service:ASL  Is  needs updated in registration: YES  Date of Appointment:8.12.25  Time of Appointment:8:50AM  Additional notes:PATIENT IS REQUESTING WE CALL [employer] [facility] [place] AND SCHEDULE NIRMAL JORGENSEN FOR  SERVICES .[phone]  PATIENT DOES NOT WANT TO USE VIDEO

## 2025-07-18 NOTE — TELEPHONE ENCOUNTER
HUB OK TO RELAY    I HAVE EMAILED [facility] [place] TO REQUEST AN  FOR HER APPT ON 8/12. I DID REQUEST NIRMAL JORGENSEN.    I WILL UPDATE THIS TELE. ENC. WITH DETAILS ONCE I RECEIVE CONFIRMATION.

## 2025-07-21 NOTE — TELEPHONE ENCOUNTER
HUB OK TO RELAY    I HAVE RECEIVED A CONFIRMATION FROM [facility] [facility] TO HAVE AN IN-PERSON  FOR HER 8/12 APPT.

## 2025-08-12 ENCOUNTER — OFFICE VISIT (OUTPATIENT)
Dept: ORTHOPEDIC SURGERY | Facility: CLINIC | Age: 75
End: 2025-08-12
Payer: OTHER MISCELLANEOUS

## 2025-08-12 VITALS
HEIGHT: 63 IN | BODY MASS INDEX: 37.56 KG/M2 | DIASTOLIC BLOOD PRESSURE: 76 MMHG | WEIGHT: 212 LBS | SYSTOLIC BLOOD PRESSURE: 116 MMHG
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
## 2025-08-12 DIAGNOSIS — S82.025A CLOSED NONDISPLACED LONGITUDINAL FRACTURE OF LEFT PATELLA, INITIAL ENCOUNTER: ICD-10-CM

## 2025-08-12 DIAGNOSIS — M17.12 PRIMARY OSTEOARTHRITIS OF LEFT KNEE: Primary | ICD-10-CM

## 2025-08-12 PROCEDURE — 99214 OFFICE O/P EST MOD 30 MIN: CPT | Performed by: ORTHOPAEDIC SURGERY

## 2025-08-12 RX ORDER — FUROSEMIDE 20 MG/1
TABLET ORAL DAILY
COMMUNITY